# Patient Record
Sex: FEMALE | Race: WHITE | NOT HISPANIC OR LATINO | URBAN - METROPOLITAN AREA
[De-identification: names, ages, dates, MRNs, and addresses within clinical notes are randomized per-mention and may not be internally consistent; named-entity substitution may affect disease eponyms.]

---

## 2017-08-01 ENCOUNTER — EMERGENCY (EMERGENCY)
Facility: HOSPITAL | Age: 47
LOS: 1 days | Discharge: HOME | End: 2017-08-01
Attending: EMERGENCY MEDICINE | Admitting: EMERGENCY MEDICINE
Payer: COMMERCIAL

## 2017-08-01 VITALS
RESPIRATION RATE: 18 BRPM | OXYGEN SATURATION: 97 % | DIASTOLIC BLOOD PRESSURE: 57 MMHG | HEART RATE: 114 BPM | SYSTOLIC BLOOD PRESSURE: 103 MMHG | TEMPERATURE: 100 F

## 2017-08-01 LAB
ALBUMIN SERPL ELPH-MCNC: 4.6 G/DL — SIGNIFICANT CHANGE UP (ref 3.3–5)
ALP SERPL-CCNC: 74 U/L — SIGNIFICANT CHANGE UP (ref 40–120)
ALT FLD-CCNC: 14 U/L RC — SIGNIFICANT CHANGE UP (ref 10–45)
ANION GAP SERPL CALC-SCNC: 13 MMOL/L — SIGNIFICANT CHANGE UP (ref 5–17)
APPEARANCE UR: CLEAR — SIGNIFICANT CHANGE UP
AST SERPL-CCNC: 17 U/L — SIGNIFICANT CHANGE UP (ref 10–40)
BASE EXCESS BLDV CALC-SCNC: 2.2 MMOL/L — HIGH (ref -2–2)
BASOPHILS # BLD AUTO: 0.1 K/UL — SIGNIFICANT CHANGE UP (ref 0–0.2)
BASOPHILS NFR BLD AUTO: 0.5 % — SIGNIFICANT CHANGE UP (ref 0–2)
BILIRUB SERPL-MCNC: 0.4 MG/DL — SIGNIFICANT CHANGE UP (ref 0.2–1.2)
BILIRUB UR-MCNC: NEGATIVE — SIGNIFICANT CHANGE UP
BUN SERPL-MCNC: 10 MG/DL — SIGNIFICANT CHANGE UP (ref 7–23)
CA-I SERPL-SCNC: 1.18 MMOL/L — SIGNIFICANT CHANGE UP (ref 1.12–1.3)
CALCIUM SERPL-MCNC: 9.4 MG/DL — SIGNIFICANT CHANGE UP (ref 8.4–10.5)
CHLORIDE BLDV-SCNC: 101 MMOL/L — SIGNIFICANT CHANGE UP (ref 96–108)
CHLORIDE SERPL-SCNC: 100 MMOL/L — SIGNIFICANT CHANGE UP (ref 96–108)
CO2 BLDV-SCNC: 30 MMOL/L — SIGNIFICANT CHANGE UP (ref 22–30)
CO2 SERPL-SCNC: 26 MMOL/L — SIGNIFICANT CHANGE UP (ref 22–31)
COLOR SPEC: SIGNIFICANT CHANGE UP
CREAT SERPL-MCNC: 0.59 MG/DL — SIGNIFICANT CHANGE UP (ref 0.5–1.3)
DIFF PNL FLD: ABNORMAL
EOSINOPHIL # BLD AUTO: 0.1 K/UL — SIGNIFICANT CHANGE UP (ref 0–0.5)
EOSINOPHIL NFR BLD AUTO: 0.7 % — SIGNIFICANT CHANGE UP (ref 0–6)
EPI CELLS # UR: SIGNIFICANT CHANGE UP /HPF
GAS PNL BLDV: 137 MMOL/L — SIGNIFICANT CHANGE UP (ref 136–145)
GAS PNL BLDV: SIGNIFICANT CHANGE UP
GAS PNL BLDV: SIGNIFICANT CHANGE UP
GLUCOSE BLDV-MCNC: 88 MG/DL — SIGNIFICANT CHANGE UP (ref 70–99)
GLUCOSE SERPL-MCNC: 91 MG/DL — SIGNIFICANT CHANGE UP (ref 70–99)
GLUCOSE UR QL: NEGATIVE — SIGNIFICANT CHANGE UP
HCG UR QL: NEGATIVE — SIGNIFICANT CHANGE UP
HCO3 BLDV-SCNC: 28 MMOL/L — SIGNIFICANT CHANGE UP (ref 21–29)
HCT VFR BLD CALC: 39.1 % — SIGNIFICANT CHANGE UP (ref 34.5–45)
HCT VFR BLDA CALC: 42 % — SIGNIFICANT CHANGE UP (ref 39–50)
HGB BLD CALC-MCNC: 13.7 G/DL — SIGNIFICANT CHANGE UP (ref 11.5–15.5)
HGB BLD-MCNC: 13.3 G/DL — SIGNIFICANT CHANGE UP (ref 11.5–15.5)
KETONES UR-MCNC: NEGATIVE — SIGNIFICANT CHANGE UP
LACTATE BLDV-MCNC: 2 MMOL/L — SIGNIFICANT CHANGE UP (ref 0.7–2)
LEUKOCYTE ESTERASE UR-ACNC: NEGATIVE — SIGNIFICANT CHANGE UP
LIDOCAIN IGE QN: 27 U/L — SIGNIFICANT CHANGE UP (ref 7–60)
LYMPHOCYTES # BLD AUTO: 14.9 % — SIGNIFICANT CHANGE UP (ref 13–44)
LYMPHOCYTES # BLD AUTO: 2.3 K/UL — SIGNIFICANT CHANGE UP (ref 1–3.3)
MCHC RBC-ENTMCNC: 32.4 PG — SIGNIFICANT CHANGE UP (ref 27–34)
MCHC RBC-ENTMCNC: 34 GM/DL — SIGNIFICANT CHANGE UP (ref 32–36)
MCV RBC AUTO: 95.3 FL — SIGNIFICANT CHANGE UP (ref 80–100)
MONOCYTES # BLD AUTO: 1.4 K/UL — HIGH (ref 0–0.9)
MONOCYTES NFR BLD AUTO: 9.2 % — SIGNIFICANT CHANGE UP (ref 2–14)
NEUTROPHILS # BLD AUTO: 11.6 K/UL — HIGH (ref 1.8–7.4)
NEUTROPHILS NFR BLD AUTO: 74.7 % — SIGNIFICANT CHANGE UP (ref 43–77)
NITRITE UR-MCNC: NEGATIVE — SIGNIFICANT CHANGE UP
PCO2 BLDV: 53 MMHG — HIGH (ref 35–50)
PH BLDV: 7.35 — SIGNIFICANT CHANGE UP (ref 7.35–7.45)
PH UR: 6.5 — SIGNIFICANT CHANGE UP (ref 5–8)
PLATELET # BLD AUTO: 193 K/UL — SIGNIFICANT CHANGE UP (ref 150–400)
PO2 BLDV: 29 MMHG — SIGNIFICANT CHANGE UP (ref 25–45)
POTASSIUM BLDV-SCNC: 3.9 MMOL/L — SIGNIFICANT CHANGE UP (ref 3.5–5)
POTASSIUM SERPL-MCNC: 4 MMOL/L — SIGNIFICANT CHANGE UP (ref 3.5–5.3)
POTASSIUM SERPL-SCNC: 4 MMOL/L — SIGNIFICANT CHANGE UP (ref 3.5–5.3)
PROT SERPL-MCNC: 8.1 G/DL — SIGNIFICANT CHANGE UP (ref 6–8.3)
PROT UR-MCNC: SIGNIFICANT CHANGE UP
RBC # BLD: 4.1 M/UL — SIGNIFICANT CHANGE UP (ref 3.8–5.2)
RBC # FLD: 12.2 % — SIGNIFICANT CHANGE UP (ref 10.3–14.5)
RBC CASTS # UR COMP ASSIST: ABNORMAL /HPF (ref 0–2)
SAO2 % BLDV: 42 % — LOW (ref 67–88)
SODIUM SERPL-SCNC: 139 MMOL/L — SIGNIFICANT CHANGE UP (ref 135–145)
SP GR SPEC: 1.02 — SIGNIFICANT CHANGE UP (ref 1.01–1.02)
UROBILINOGEN FLD QL: NEGATIVE — SIGNIFICANT CHANGE UP
WBC # BLD: 15.6 K/UL — HIGH (ref 3.8–10.5)
WBC # FLD AUTO: 15.6 K/UL — HIGH (ref 3.8–10.5)
WBC UR QL: SIGNIFICANT CHANGE UP /HPF (ref 0–5)

## 2017-08-01 PROCEDURE — 74177 CT ABD & PELVIS W/CONTRAST: CPT | Mod: 26

## 2017-08-01 PROCEDURE — 76705 ECHO EXAM OF ABDOMEN: CPT | Mod: 26,RT

## 2017-08-01 PROCEDURE — 99285 EMERGENCY DEPT VISIT HI MDM: CPT

## 2017-08-01 RX ORDER — ACETAMINOPHEN 500 MG
1000 TABLET ORAL ONCE
Refills: 0 | Status: COMPLETED | OUTPATIENT
Start: 2017-08-01 | End: 2017-08-01

## 2017-08-01 RX ORDER — SODIUM CHLORIDE 9 MG/ML
1000 INJECTION INTRAMUSCULAR; INTRAVENOUS; SUBCUTANEOUS
Refills: 0 | Status: DISCONTINUED | OUTPATIENT
Start: 2017-08-01 | End: 2017-08-05

## 2017-08-01 RX ORDER — ONDANSETRON 8 MG/1
4 TABLET, FILM COATED ORAL ONCE
Refills: 0 | Status: COMPLETED | OUTPATIENT
Start: 2017-08-01 | End: 2017-08-01

## 2017-08-01 RX ORDER — SODIUM CHLORIDE 9 MG/ML
1000 INJECTION INTRAMUSCULAR; INTRAVENOUS; SUBCUTANEOUS ONCE
Refills: 0 | Status: COMPLETED | OUTPATIENT
Start: 2017-08-01 | End: 2017-08-01

## 2017-08-01 RX ADMIN — SODIUM CHLORIDE 150 MILLILITER(S): 9 INJECTION INTRAMUSCULAR; INTRAVENOUS; SUBCUTANEOUS at 19:49

## 2017-08-01 RX ADMIN — Medication 400 MILLIGRAM(S): at 18:57

## 2017-08-01 RX ADMIN — Medication 1000 MILLIGRAM(S): at 18:59

## 2017-08-01 RX ADMIN — SODIUM CHLORIDE 2000 MILLILITER(S): 9 INJECTION INTRAMUSCULAR; INTRAVENOUS; SUBCUTANEOUS at 17:00

## 2017-08-01 NOTE — ED PROVIDER NOTE - MEDICAL DECISION MAKING DETAILS
Javi Powell MD (resident): 46 F w/ Hx diverticulitis, who p/w generalized abd pain w/ subjective fever that started 1 day ago, constant, dull, not worse w/ food.

## 2017-08-01 NOTE — ED ADULT NURSE NOTE - OBJECTIVE STATEMENT
45 y/o F, reported to ED from home. A&Ox3, c/o abd pain and fever. Pt reports that she started not feeling well yesterday. Pt reports that she had a temperature of 100.3 the highest. Pt reports that she took Tylenol this morning for the fever. Pt's temperature is currently 98.7 orally. Pt reports that she is having a H/A, denies vision changes. Pt denies N/V/D. Pt reports that "my abdomen feels bloated and I haven't been eating as much." Pt reports that her son is sick with a virus at home. Pt denies LOC, SOB, C/P. Pt reports hx of diverticulitis but states "this discomfort is not like that pain."  at bedside, will continue to monitor pt.

## 2017-08-01 NOTE — ED PROVIDER NOTE - PLAN OF CARE
1) take Cipro and Flagyl as prescribed   2) Drink plenty of fluids   3) You were given a copy of your results, please show them to your doctor for review.   4) Please follow up with your primary medical doctor in 2-3 days for reevaluation. If you do not have pmd please call the general medicine clinic for an appointment at [phone].   5) also, follow up with Gastroenterology in 1-2 days, call [phone] for appointment   6) return to the ED for worsening pain, nausea, vomiting, fever greater than 100.4, chest pain, shortness of breath, diarrhea, constipation, or if you have any other new, worsening, or concerning symptoms.

## 2017-08-01 NOTE — ED PROVIDER NOTE - PHYSICAL EXAMINATION
Physical Exam: middle aged F who is in NAD, AAOx3, NCAT, MMM, neck is supple, PERRL, CTAB, tachycardia and regular rhythm, abdomen is soft and NTND, No edema, No deformity of extremities, No rashes, CN grossly intact, No focal motor or sensory deficits. ~ Javi Powell MD

## 2017-08-01 NOTE — ED PROVIDER NOTE - CARE PLAN
Principal Discharge DX:	Diverticulitis  Instructions for follow-up, activity and diet:	1) take Cipro and Flagyl as prescribed   2) Drink plenty of fluids   3) You were given a copy of your results, please show them to your doctor for review.   4) Please follow up with your primary medical doctor in 2-3 days for reevaluation. If you do not have pmd please call the general medicine clinic for an appointment at [phone].   5) also, follow up with Gastroenterology in 1-2 days, call [phone] for appointment   6) return to the ED for worsening pain, nausea, vomiting, fever greater than 100.4, chest pain, shortness of breath, diarrhea, constipation, or if you have any other new, worsening, or concerning symptoms. Principal Discharge DX:	Diverticulitis  Instructions for follow-up, activity and diet:	1) take Cipro and Flagyl as prescribed   2) Drink plenty of fluids   3) You were given a copy of your results, please show them to your doctor for review.   4) Please follow up with your primary medical doctor in 2-3 days for reevaluation. If you do not have pmd please call the general medicine clinic for an appointment at [phone].   5) also, follow up with Gastroenterology in 1-2 days, call [phone] for appointment   6) return to the ED for worsening pain, nausea, vomiting, fever greater than 100.4, chest pain, shortness of breath, diarrhea, constipation, or if you have any other new, worsening, or concerning symptoms.

## 2017-08-01 NOTE — ED PROVIDER NOTE - OBJECTIVE STATEMENT
46 F w/ Hx diverticulitis, who p/w generalized abd pain w/ subjective fever that started 1 day ago, constant, dull, not worse w/ food. Also reports body aches. + decreased appetite. Last BM yesterday, no vomiting, diarrhea, or dysuria.     PMD: Steven Callejas

## 2017-08-01 NOTE — ED PROVIDER NOTE - ATTENDING CONTRIBUTION TO CARE
I have examined and evaluated this patient with the above resident or PA, and agree with the documented clinical history, exam and plan.   Briefly: Pt with h/o diverticulitis; p/w RUQ and right mid abdominal pain; on exam is well appearing but tender in right side of abdomen.  RUQ US performed, no signs of acute cholecystitis.  CT performed, showing diverticulitis at the hepatic flexure.  Patient remains well appearing, tolerating po; will dc on ABx.

## 2017-08-01 NOTE — ED PROVIDER NOTE - PROGRESS NOTE DETAILS
MD Sukumar: Feels improved, Mild right upper quadrant tenderness now, acute diverticulitis non complicated, will d/c home, strict return precautions.

## 2017-08-02 VITALS
OXYGEN SATURATION: 100 % | RESPIRATION RATE: 20 BRPM | SYSTOLIC BLOOD PRESSURE: 120 MMHG | HEART RATE: 100 BPM | TEMPERATURE: 98 F | DIASTOLIC BLOOD PRESSURE: 66 MMHG

## 2017-08-02 PROCEDURE — 85014 HEMATOCRIT: CPT

## 2017-08-02 PROCEDURE — 99284 EMERGENCY DEPT VISIT MOD MDM: CPT | Mod: 25

## 2017-08-02 PROCEDURE — 82947 ASSAY GLUCOSE BLOOD QUANT: CPT

## 2017-08-02 PROCEDURE — 83690 ASSAY OF LIPASE: CPT

## 2017-08-02 PROCEDURE — 74177 CT ABD & PELVIS W/CONTRAST: CPT

## 2017-08-02 PROCEDURE — 81001 URINALYSIS AUTO W/SCOPE: CPT

## 2017-08-02 PROCEDURE — 81025 URINE PREGNANCY TEST: CPT

## 2017-08-02 PROCEDURE — 76705 ECHO EXAM OF ABDOMEN: CPT

## 2017-08-02 PROCEDURE — 83605 ASSAY OF LACTIC ACID: CPT

## 2017-08-02 PROCEDURE — 84132 ASSAY OF SERUM POTASSIUM: CPT

## 2017-08-02 PROCEDURE — 82435 ASSAY OF BLOOD CHLORIDE: CPT

## 2017-08-02 PROCEDURE — 96374 THER/PROPH/DIAG INJ IV PUSH: CPT | Mod: XU

## 2017-08-02 PROCEDURE — 80053 COMPREHEN METABOLIC PANEL: CPT

## 2017-08-02 PROCEDURE — 84295 ASSAY OF SERUM SODIUM: CPT

## 2017-08-02 PROCEDURE — 82803 BLOOD GASES ANY COMBINATION: CPT

## 2017-08-02 PROCEDURE — 82330 ASSAY OF CALCIUM: CPT

## 2017-08-02 PROCEDURE — 85027 COMPLETE CBC AUTOMATED: CPT

## 2017-08-02 RX ORDER — METRONIDAZOLE 500 MG
500 TABLET ORAL ONCE
Refills: 0 | Status: COMPLETED | OUTPATIENT
Start: 2017-08-02 | End: 2017-08-02

## 2017-08-02 RX ORDER — MOXIFLOXACIN HYDROCHLORIDE TABLETS, 400 MG 400 MG/1
1 TABLET, FILM COATED ORAL
Qty: 20 | Refills: 0
Start: 2017-08-02 | End: 2017-08-12

## 2017-08-02 RX ORDER — CIPROFLOXACIN LACTATE 400MG/40ML
500 VIAL (ML) INTRAVENOUS ONCE
Refills: 0 | Status: COMPLETED | OUTPATIENT
Start: 2017-08-02 | End: 2017-08-02

## 2017-08-02 RX ORDER — METRONIDAZOLE 500 MG
1 TABLET ORAL
Qty: 42 | Refills: 0
Start: 2017-08-02 | End: 2017-08-16

## 2017-08-02 RX ADMIN — Medication 500 MILLIGRAM(S): at 00:30

## 2017-12-12 ENCOUNTER — APPOINTMENT (OUTPATIENT)
Dept: OTOLARYNGOLOGY | Facility: CLINIC | Age: 47
End: 2017-12-12
Payer: COMMERCIAL

## 2017-12-12 VITALS
HEART RATE: 64 BPM | SYSTOLIC BLOOD PRESSURE: 106 MMHG | DIASTOLIC BLOOD PRESSURE: 45 MMHG | HEIGHT: 66 IN | WEIGHT: 150 LBS | BODY MASS INDEX: 24.11 KG/M2

## 2017-12-12 DIAGNOSIS — Z83.3 FAMILY HISTORY OF DIABETES MELLITUS: ICD-10-CM

## 2017-12-12 DIAGNOSIS — Z82.3 FAMILY HISTORY OF STROKE: ICD-10-CM

## 2017-12-12 DIAGNOSIS — Z78.9 OTHER SPECIFIED HEALTH STATUS: ICD-10-CM

## 2017-12-12 DIAGNOSIS — Z80.9 FAMILY HISTORY OF MALIGNANT NEOPLASM, UNSPECIFIED: ICD-10-CM

## 2017-12-12 PROCEDURE — 31231 NASAL ENDOSCOPY DX: CPT

## 2017-12-12 PROCEDURE — 99244 OFF/OP CNSLTJ NEW/EST MOD 40: CPT | Mod: 25

## 2018-01-29 ENCOUNTER — APPOINTMENT (OUTPATIENT)
Dept: OTOLARYNGOLOGY | Facility: CLINIC | Age: 48
End: 2018-01-29
Payer: COMMERCIAL

## 2018-01-29 VITALS
HEIGHT: 66 IN | DIASTOLIC BLOOD PRESSURE: 70 MMHG | BODY MASS INDEX: 24.11 KG/M2 | WEIGHT: 150 LBS | HEART RATE: 70 BPM | SYSTOLIC BLOOD PRESSURE: 106 MMHG

## 2018-01-29 DIAGNOSIS — J35.1 HYPERTROPHY OF TONSILS: ICD-10-CM

## 2018-01-29 PROCEDURE — 99214 OFFICE O/P EST MOD 30 MIN: CPT

## 2018-02-07 ENCOUNTER — APPOINTMENT (OUTPATIENT)
Dept: OBGYN | Facility: CLINIC | Age: 48
End: 2018-02-07

## 2018-02-13 ENCOUNTER — APPOINTMENT (OUTPATIENT)
Dept: OBGYN | Facility: CLINIC | Age: 48
End: 2018-02-13

## 2018-02-25 ENCOUNTER — TRANSCRIPTION ENCOUNTER (OUTPATIENT)
Age: 48
End: 2018-02-25

## 2018-02-26 ENCOUNTER — APPOINTMENT (OUTPATIENT)
Dept: OTOLARYNGOLOGY | Facility: CLINIC | Age: 48
End: 2018-02-26

## 2018-04-16 ENCOUNTER — APPOINTMENT (OUTPATIENT)
Dept: OTOLARYNGOLOGY | Facility: CLINIC | Age: 48
End: 2018-04-16

## 2018-07-25 PROBLEM — Z78.9 ALCOHOL USE: Status: ACTIVE | Noted: 2017-12-12

## 2018-10-19 ENCOUNTER — TRANSCRIPTION ENCOUNTER (OUTPATIENT)
Age: 48
End: 2018-10-19

## 2019-03-07 ENCOUNTER — APPOINTMENT (OUTPATIENT)
Dept: SURGERY | Facility: CLINIC | Age: 49
End: 2019-03-07
Payer: COMMERCIAL

## 2019-03-07 VITALS
BODY MASS INDEX: 25.71 KG/M2 | OXYGEN SATURATION: 98 % | DIASTOLIC BLOOD PRESSURE: 64 MMHG | WEIGHT: 160 LBS | HEIGHT: 66 IN | HEART RATE: 90 BPM | TEMPERATURE: 98.3 F | SYSTOLIC BLOOD PRESSURE: 103 MMHG | RESPIRATION RATE: 15 BRPM

## 2019-03-07 DIAGNOSIS — Z83.79 FAMILY HISTORY OF OTHER DISEASES OF THE DIGESTIVE SYSTEM: ICD-10-CM

## 2019-03-07 DIAGNOSIS — K57.32 DIVERTICULITIS OF LARGE INTESTINE W/OUT PERFORATION OR ABSCESS W/OUT BLEEDING: ICD-10-CM

## 2019-03-07 PROCEDURE — 99244 OFF/OP CNSLTJ NEW/EST MOD 40: CPT

## 2019-03-07 RX ORDER — PNV NO.95/FERROUS FUM/FOLIC AC 28MG-0.8MG
TABLET ORAL
Refills: 0 | Status: ACTIVE | COMMUNITY

## 2019-03-07 RX ORDER — CHOLECALCIFEROL (VITAMIN D3) 25 MCG
TABLET ORAL
Refills: 0 | Status: ACTIVE | COMMUNITY

## 2019-03-07 RX ORDER — OMEGA-3/DHA/EPA/FISH OIL 300-1000MG
CAPSULE ORAL
Refills: 0 | Status: ACTIVE | COMMUNITY

## 2019-03-07 RX ORDER — AZITHROMYCIN DIHYDRATE 250 MG/1
250 TABLET, FILM COATED ORAL
Qty: 1 | Refills: 2 | Status: DISCONTINUED | COMMUNITY
Start: 2017-12-12 | End: 2019-03-07

## 2019-03-07 RX ORDER — ESCITALOPRAM OXALATE 10 MG/1
10 TABLET, FILM COATED ORAL
Refills: 0 | Status: DISCONTINUED | COMMUNITY
End: 2019-03-07

## 2019-03-07 RX ORDER — METHYLPREDNISOLONE 4 MG/1
4 TABLET ORAL
Qty: 1 | Refills: 0 | Status: DISCONTINUED | COMMUNITY
Start: 2017-12-12 | End: 2019-03-07

## 2019-03-07 RX ORDER — MULTIVITAMIN
TABLET ORAL
Refills: 0 | Status: ACTIVE | COMMUNITY

## 2019-03-07 NOTE — PHYSICAL EXAM
[Normal Breath Sounds] : Normal breath sounds [Normal Heart Sounds] : normal heart sounds [Normal Rate and Rhythm] : normal rate and rhythm [No Rash or Lesion] : No rash or lesion [Alert] : alert [Oriented to Person] : oriented to person [Oriented to Place] : oriented to place [Oriented to Time] : oriented to time [Calm] : calm [Abdomen Masses] : No abdominal masses [Abdomen Tenderness] : ~T No ~M abdominal tenderness [No HSM] : no hepatosplenomegaly [Normal rectal exam] : exam was normal [JVD] : no jugular venous distention  [de-identified] : Well nourished female, in no apparent distress [de-identified] : WNL [de-identified] : Full ROM

## 2019-03-07 NOTE — HISTORY OF PRESENT ILLNESS
[FreeTextEntry1] : Micheline is a 47 y/o female here for evaluation of diverticulitis. Patient reports multiple bouts of diverticulitis attacks starting at age 30. She was hospitalized one time for diverticulitis. Each time she is treated with Cipro and Flagyl. Today, denies abdominal pain. Has occasional RLQ abdominal twinges of pain. Has normal soft BM daily. \par \par CT from 2/6/19 demonstrated minimal residual thickening involving a diverticulum in the proximal ascending colon with resolved surrounding inflammatory stranding compatible with resolving diverticulitis compared to CT 12/14/18. No pericolonic fluid collection. Pancolonic diverticulosis. \par \par CT from 12/14/18 demonstrated diverticulitis in the proximal ascending colon. No abscess or extraluminal gas collection. There is confluent opacity in the area of inflammation. Normal appendix. Extensive diverticulosis is noted elsewhere throughout the colon. \par \par CT from 8/1/17 demonstrated acute diverticulitis involving the proximal transverse colon at the hepatic flexure with moderate surrounding inflammatory change. No extraluminal free air or fluid collection. \par \par Colonoscopy from 11/13/17 demonstrated moderate diverticulosis noted throughout the entire colon. 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 Pathology: Melanosis coli. Small grade 1 hemorrhoids.

## 2019-03-07 NOTE — ASSESSMENT
[FreeTextEntry1] : I have seen and evaluated patient, and I have corroborated all nursing input into this note. Patient with recurrent episodes of right-sided diverticulitis. An elective laparoscopic resection should be strongly considered. The patient has not had left-sided diverticulitis but does have pan colonic diverticulosis. The patient is at high risk for recurrent diverticulitis on the right side because of her history. However, she is at low risk for diverticulitis on the left side because she has had not had episodes on that side and her risk on the left side is no different than anyone who has diverticulosis and who has not had diverticulitis. I reviewed indications, risks, benefits, alternatives for laparoscopic possible open right colectomy including but not limited to bleeding, infection, change in bowel habits, and anastomotic leak. All questions were answered. The patient will make a decision about surgery and she'll notify my office. The patient knows that she should contact my office immediately if she develops any recurrent signs or symptoms of diverticulitis.

## 2019-03-07 NOTE — CONSULT LETTER
[Dear  ___] : Dear ~IGLESIA, [Consult Letter:] : I had the pleasure of evaluating your patient, [unfilled]. [Please see my note below.] : Please see my note below. [Consult Closing:] : Thank you very much for allowing me to participate in the care of this patient.  If you have any questions, please do not hesitate to contact me. [Sincerely,] : Sincerely, [FreeTextEntry2] : Dr. Yaw Mcnamara [FreeTextEntry3] : Real Wu M.D., DERBA.GOYO., F.MAYNOR.S.[email or web address].\[facility] Chief Colorectal Clinical Services, [facility]

## 2019-08-18 ENCOUNTER — TRANSCRIPTION ENCOUNTER (OUTPATIENT)
Age: 49
End: 2019-08-18

## 2019-09-19 ENCOUNTER — TRANSCRIPTION ENCOUNTER (OUTPATIENT)
Age: 49
End: 2019-09-19

## 2020-02-16 ENCOUNTER — TRANSCRIPTION ENCOUNTER (OUTPATIENT)
Age: 50
End: 2020-02-16

## 2020-04-19 ENCOUNTER — TRANSCRIPTION ENCOUNTER (OUTPATIENT)
Age: 50
End: 2020-04-19

## 2020-07-12 ENCOUNTER — TRANSCRIPTION ENCOUNTER (OUTPATIENT)
Age: 50
End: 2020-07-12

## 2020-11-06 ENCOUNTER — APPOINTMENT (OUTPATIENT)
Dept: ORTHOPEDIC SURGERY | Facility: CLINIC | Age: 50
End: 2020-11-06
Payer: COMMERCIAL

## 2020-11-06 VITALS
WEIGHT: 163 LBS | TEMPERATURE: 97.9 F | HEIGHT: 66 IN | HEART RATE: 88 BPM | DIASTOLIC BLOOD PRESSURE: 76 MMHG | SYSTOLIC BLOOD PRESSURE: 110 MMHG | BODY MASS INDEX: 26.2 KG/M2

## 2020-11-06 DIAGNOSIS — M75.41 IMPINGEMENT SYNDROME OF RIGHT SHOULDER: ICD-10-CM

## 2020-11-06 PROCEDURE — 99203 OFFICE O/P NEW LOW 30 MIN: CPT | Mod: 25

## 2020-11-06 PROCEDURE — 73030 X-RAY EXAM OF SHOULDER: CPT | Mod: RT

## 2020-11-06 PROCEDURE — 99072 ADDL SUPL MATRL&STAF TM PHE: CPT

## 2020-11-06 PROCEDURE — 20610 DRAIN/INJ JOINT/BURSA W/O US: CPT | Mod: RT

## 2020-12-11 ENCOUNTER — RESULT REVIEW (OUTPATIENT)
Age: 50
End: 2020-12-11

## 2020-12-16 ENCOUNTER — RX RENEWAL (OUTPATIENT)
Age: 50
End: 2020-12-16

## 2020-12-23 ENCOUNTER — TRANSCRIPTION ENCOUNTER (OUTPATIENT)
Age: 50
End: 2020-12-23

## 2020-12-30 ENCOUNTER — EMERGENCY (EMERGENCY)
Facility: HOSPITAL | Age: 50
LOS: 1 days | Discharge: HOME | End: 2020-12-30
Attending: STUDENT IN AN ORGANIZED HEALTH CARE EDUCATION/TRAINING PROGRAM
Payer: COMMERCIAL

## 2020-12-30 VITALS
WEIGHT: 160.06 LBS | TEMPERATURE: 99 F | RESPIRATION RATE: 17 BRPM | HEART RATE: 98 BPM | SYSTOLIC BLOOD PRESSURE: 104 MMHG | DIASTOLIC BLOOD PRESSURE: 70 MMHG | HEIGHT: 66 IN | OXYGEN SATURATION: 98 %

## 2020-12-30 PROCEDURE — 70450 CT HEAD/BRAIN W/O DYE: CPT

## 2020-12-30 PROCEDURE — 70450 CT HEAD/BRAIN W/O DYE: CPT | Mod: 26

## 2020-12-30 PROCEDURE — 99284 EMERGENCY DEPT VISIT MOD MDM: CPT

## 2020-12-30 PROCEDURE — 99284 EMERGENCY DEPT VISIT MOD MDM: CPT | Mod: 25

## 2020-12-30 RX ORDER — ACETAMINOPHEN 500 MG
975 TABLET ORAL ONCE
Refills: 0 | Status: COMPLETED | OUTPATIENT
Start: 2020-12-30 | End: 2020-12-30

## 2020-12-30 RX ADMIN — Medication 975 MILLIGRAM(S): at 22:18

## 2020-12-30 NOTE — ED PROVIDER NOTE - CHPI ED SYMPTOMS NEG
no numbness no weakness/no blurred vision/no loss of consciousness/no weakness/no change in level of consciousness

## 2020-12-30 NOTE — ED PROVIDER NOTE - PROGRESS NOTE DETAILS
pettet- Patient feels well, tolerating PO. Discussed radiology findings with patient. Patient feels comfortable going home. Gave home care and follow up instructions. Discussed which symptoms to look out for and when to return to the ED for further evaluation. Patient given opportunity to ask questions about their medical condition and had all questions answered.

## 2020-12-30 NOTE — ED ADULT NURSE NOTE - OBJECTIVE STATEMENT
50 year old female presenting to ED c/o headache. PMH includes HLD. Pt A+Ox3, ambulates appropriately, reports headache x3 days s/p head injury. Pt reports standing up suddenly in crawl space and hitting back of head. Pt also reports intermittent dizziness, difficulty concentrating, fogginess, and occasionally feels off balance. Pt reports pain is exacerbated with movement and exertion, and alleviated with Tylenol. Pt denies change in vision, chest pain, SOB, N/V, abdominal pain, urinary or bowel symptoms, fevers or chills.

## 2020-12-30 NOTE — ED PROVIDER NOTE - PHYSICAL EXAMINATION
GEN: Pt in NAD, A&O x3. GCS 15.   PSYCH: Anxious.  EYES: No periorbital ecchymosis, sclera white w/o injection PERRLA, EOMI w/o pain.  HENT: Head NCAT. Nares without deformity, no DC. No auricular tenderness, no ear DC. no Rowley's sign. Neck supple FROM. No midline or paraspinal tenderness. Trachea midline.   RESP: No retractions or chest wall deformities, no signs of trauma/bruising. No chest wall tenderness, CTA b/l, no wheezes, rales, or rhonchi.   CARDIAC: RRR clear distinct S1, S2, no murmurs, gallops, or rubs.   MSK: No joint erythema or obvious deformities. Spine without obvious deformity. No midline tenderness. FROM w/o pain of UE and LE b/l.   NEURO: CN2-12 intact. Normal and equal sensation UE, LE and face b/l. 5/5 strength upper and lower extremity b/l. Pronator drift negative. Normal gait and gross cerebellar functioning.   SKIN: No rashes noted.

## 2020-12-30 NOTE — ED PROVIDER NOTE - NSFOLLOWUPINSTRUCTIONS_ED_ALL_ED_FT
1) Follow up with your PCP within 24-48 hours.      Follow up in the concussion clinic    2) Rest, Take Tylenol 650mg (2 regular strength pills) every 4-6 hours as needed for pain.    3) Avoid work, school, and physical activity until instructed to return by a medical provider (at least 24 hours)    4) **Return to the ER immediately if you experience:    -Inability to awaken the patient at time of expected wakening    -Severe or worsening headaches    -Prolonged sleeping or confusion    -Restlessness, unsteadiness, or seizures    -Difficulties with vision    -Vomiting, fever, or stiff neck    -Urinary or bowel incontinence    -Weakness or numbness involving any part of the body.

## 2020-12-30 NOTE — ED PROVIDER NOTE - PATIENT PORTAL LINK FT
You can access the FollowMyHealth Patient Portal offered by [facility] [place] by registering at the following website: [email or web address]. By joining [email or web address] FollowMyHealth portal, you will also be able to view your health information using other applications (apps) compatible with our system.

## 2020-12-30 NOTE — ED PROVIDER NOTE - ATTENDING CONTRIBUTION TO CARE
I performed a history and physical exam of the patient and discussed their management with the PA. I reviewed the PA's note and agree with the documented findings and plan of care. My medical decision making and observations are found above.    50F p/w mult medical complaints s/p head injury 3d ago. Has low mechanism head injury, no loc, no ac 3d ago now with intermittent fatigue, difficulty concentrating. She occasionally feels off balance and dizzy. No excessive vomiting, double vision, weakness. On exam, patient well appearing, nad. cv rrr no m/r/g, lungs ctabl no resp distress. no evidence of depressed skull fracture/basilar skull fracture. cn2-12 groslsy intact, normal speech and tone, normal gait. No evidence of meningismus. 5/5 strength in distal extremities b/l. Benign exam and low suspicion for ich although given patient's level of concern, will ct head to eval for subacute ICH. more likely c/w postconcussive syndrome. Will reassess pending imaging. likely d/c home with strict return precautions and close f/u.

## 2020-12-30 NOTE — ED PROVIDER NOTE - OBJECTIVE STATEMENT
50 year old F with pmhx of HLD presents to ED c/o HA s/p head injury 3 days ago. Pt reports that 3 days ago she was in a crawl space and stood up suddenly, hitting her head on the ceiling. No LOC. No change in sensorium. Afterwards pt endorses intermittent HA. Has been taking Tylenol intermittently, last dose this am with improvement. Pt notes pain improved yesterday but worsening again today. She reports fatigue, difficulty concentrating. She occasionally feels off balance and dizzy, which she describes as everyone is spinning. Pt notes tingling on the left upper face. Denies other numbness, paraesthesias, weakness, vomiting, syncope, change in vision, difficulty speaking, difficulty swallowing. Not on ACs. Pt has hx of 2 prior concussions but hasn't had one in 7 years. Pt reports that pain is worse with moving her extremities or lifting a dog crate earlier today.

## 2020-12-30 NOTE — ED PROVIDER NOTE - INTERNATIONAL TRAVEL
11/17/19                            Radha Alexander  [address]   [place] [address]    To Whom It May Concern:    This is to certify Radha Alexander was evaluated with Lesa Piña CNP on 11/17/19 and can return to school on 11/19/2019.     RESTRICTIONS: NONE            Lesa Piña CNP  [facility] [address] [address]  Phone: [phone]  Fax: [phone]                     No

## 2020-12-31 VITALS
DIASTOLIC BLOOD PRESSURE: 46 MMHG | OXYGEN SATURATION: 98 % | SYSTOLIC BLOOD PRESSURE: 95 MMHG | RESPIRATION RATE: 18 BRPM | HEART RATE: 71 BPM | TEMPERATURE: 98 F

## 2021-01-06 ENCOUNTER — APPOINTMENT (OUTPATIENT)
Dept: ORTHOPEDIC SURGERY | Facility: CLINIC | Age: 51
End: 2021-01-06

## 2021-01-13 ENCOUNTER — RESULT REVIEW (OUTPATIENT)
Age: 51
End: 2021-01-13

## 2021-01-27 ENCOUNTER — OUTPATIENT (OUTPATIENT)
Dept: OUTPATIENT SERVICES | Facility: HOSPITAL | Age: 51
LOS: 1 days | End: 2021-01-27
Payer: COMMERCIAL

## 2021-01-27 ENCOUNTER — APPOINTMENT (OUTPATIENT)
Dept: MAMMOGRAPHY | Facility: CLINIC | Age: 51
End: 2021-01-27
Payer: COMMERCIAL

## 2021-01-27 DIAGNOSIS — Z00.00 ENCOUNTER FOR GENERAL ADULT MEDICAL EXAMINATION WITHOUT ABNORMAL FINDINGS: ICD-10-CM

## 2021-01-27 PROCEDURE — 77067 SCR MAMMO BI INCL CAD: CPT

## 2021-01-27 PROCEDURE — 77063 BREAST TOMOSYNTHESIS BI: CPT

## 2021-01-27 PROCEDURE — 77063 BREAST TOMOSYNTHESIS BI: CPT | Mod: 26

## 2021-01-27 PROCEDURE — 77067 SCR MAMMO BI INCL CAD: CPT | Mod: 26

## 2021-04-07 ENCOUNTER — APPOINTMENT (OUTPATIENT)
Dept: DISASTER EMERGENCY | Facility: OTHER | Age: 51
End: 2021-04-07

## 2022-02-05 ENCOUNTER — TRANSCRIPTION ENCOUNTER (OUTPATIENT)
Age: 52
End: 2022-02-05

## 2022-02-06 ENCOUNTER — RESULT REVIEW (OUTPATIENT)
Age: 52
End: 2022-02-06

## 2022-02-28 PROBLEM — E78.5 HYPERLIPIDEMIA, UNSPECIFIED: Chronic | Status: ACTIVE | Noted: 2020-12-30

## 2022-03-23 ENCOUNTER — APPOINTMENT (OUTPATIENT)
Dept: MAMMOGRAPHY | Facility: CLINIC | Age: 52
End: 2022-03-23
Payer: COMMERCIAL

## 2022-03-23 ENCOUNTER — OUTPATIENT (OUTPATIENT)
Dept: OUTPATIENT SERVICES | Facility: HOSPITAL | Age: 52
LOS: 1 days | End: 2022-03-23
Payer: COMMERCIAL

## 2022-03-23 DIAGNOSIS — Z00.8 ENCOUNTER FOR OTHER GENERAL EXAMINATION: ICD-10-CM

## 2022-03-23 PROCEDURE — 77067 SCR MAMMO BI INCL CAD: CPT | Mod: 26

## 2022-03-23 PROCEDURE — 77067 SCR MAMMO BI INCL CAD: CPT

## 2022-03-23 PROCEDURE — 77063 BREAST TOMOSYNTHESIS BI: CPT

## 2022-03-23 PROCEDURE — 77063 BREAST TOMOSYNTHESIS BI: CPT | Mod: 26

## 2022-04-08 ENCOUNTER — APPOINTMENT (OUTPATIENT)
Dept: OTOLARYNGOLOGY | Facility: CLINIC | Age: 52
End: 2022-04-08
Payer: COMMERCIAL

## 2022-04-08 VITALS
HEART RATE: 66 BPM | HEIGHT: 66 IN | TEMPERATURE: 98.2 F | BODY MASS INDEX: 26.2 KG/M2 | WEIGHT: 163 LBS | DIASTOLIC BLOOD PRESSURE: 79 MMHG | SYSTOLIC BLOOD PRESSURE: 108 MMHG

## 2022-04-08 PROCEDURE — 31231 NASAL ENDOSCOPY DX: CPT

## 2022-04-08 PROCEDURE — 99072 ADDL SUPL MATRL&STAF TM PHE: CPT

## 2022-04-08 PROCEDURE — 69210 REMOVE IMPACTED EAR WAX UNI: CPT

## 2022-04-08 PROCEDURE — 99204 OFFICE O/P NEW MOD 45 MIN: CPT | Mod: 25

## 2022-04-08 NOTE — END OF VISIT
[FreeTextEntry3] : I saw and examined this patient in person. I have discussed with Zully Craven, Physician Assistant, in detail the above note and agree with the above assessment and plan of care.\par

## 2022-04-08 NOTE — REVIEW OF SYSTEMS
[Ear Itch] : ear itch [Negative] : Heme/Lymph [Seasonal Allergies] : seasonal allergies [de-identified] : ear clogging

## 2022-04-08 NOTE — HISTORY OF PRESENT ILLNESS
[de-identified] : Patient has been having some ear clogging in both ears, worse on the left for the last few months. She does not have any pain in the ears or pressure and does not have any ringing in the ears or dizziness. She has mild seasonal allergies but no acute nasal congestion or runny nose. She does not have any concerns for her hearing. She does have occasional mild itching in the left ear. She did recently try to clean both ears with an over the counter liquid and bulb syringe. When her allergies are acting up she uses Flonase at night

## 2022-06-01 ENCOUNTER — APPOINTMENT (OUTPATIENT)
Dept: ORTHOPEDIC SURGERY | Facility: CLINIC | Age: 52
End: 2022-06-01
Payer: COMMERCIAL

## 2022-06-01 DIAGNOSIS — Z78.9 OTHER SPECIFIED HEALTH STATUS: ICD-10-CM

## 2022-06-01 DIAGNOSIS — M79.672 PAIN IN LEFT FOOT: ICD-10-CM

## 2022-06-01 PROCEDURE — 99203 OFFICE O/P NEW LOW 30 MIN: CPT

## 2022-06-01 PROCEDURE — 99072 ADDL SUPL MATRL&STAF TM PHE: CPT

## 2022-06-01 PROCEDURE — 73610 X-RAY EXAM OF ANKLE: CPT | Mod: LT

## 2022-06-01 NOTE — HISTORY OF PRESENT ILLNESS
[7] : 7 [5] : 5 [Dull/Aching] : dull/aching [Localized] : localized [Throbbing] : throbbing [Constant] : constant [de-identified] : 06/01/22  TRINA DU is a 51 year female who is here today for lt lower leg pain. She states she fell a year ago and had a fracture that didn't get better properly. \par  \par Ms. DU is a 51 year female who presents today for evaluation of their Left leg and left ankle pain and swelling. She states that close to a year ago she fell and injured the proximal aspect of the left leg both in the front and on the side of her left leg and she continues to have sharp pain which she points mostly to the front inside aspect of the proximal leg. She also has diffuse pain in the left ankle as well as diffuse achiness and soreness in the entire left leg. She does have pain also and soreness on the outside aspect of her leg. She feels that two months after her fall she did have a sprain type of injury to the left ankle back in august period she has been using an ankle sleeve period [] : no [FreeTextEntry1] : Lt  lower leg [FreeTextEntry5] :  TRINA DU is a 51 year female who is here today for lt lower leg pain.  [de-identified] : X Rays [de-identified] : ice, creams

## 2022-06-01 NOTE — ASSESSMENT
[FreeTextEntry1] : After her examination today in the office and review of her radiographs I am concerned for her proximal leg pain as well as her diffuse leg pain and diffuse ankle pain after her injury close to one year ago and a moderate to severe ankle sprain eight months ago. I am concerned for a possible syndesmotic chronic injury as a result of their injury and I would like to order a MRI of both the tibia and fibula as well as a MRI of the ankle to further evaluate her sharp pain that she has had for the past year along the proximal tibia as well as her diffuse leg pain and ankle pain with a MRI of the ankle to further evaluate the ankle ligaments as well as the syndesmotic ligaments and stability period i did recommend a better supportive lace up ankle brace on a daily basis and to arrange from any running or jumping and i would like to see her back after the MRI's are completed

## 2022-06-01 NOTE — PHYSICAL EXAM
[de-identified] : General: Well-nourished, well developed female in no apparent distress\par Psych: Clear speech, pleasant mood and affect\par Neurological: Alert and oriented to person, place, and time\par Gait: Antalgic\par Respiratory: Normal respiratory effort\par Skin: No rashes, no lesions, no open skin, no erythema\par \par On examination of the left ankle and foot:\par \par Inspection: Mild swelling without ecchymosis over the medial and  lateral aspect of the ankle. No erythema noted. Hindfoot alignment is anatomic valgus.\par \par Palpation: They are tender over the anterior ankle joint line. Tenderness over the anterior lateral ankle over the area of the ATFL and CFL. They are tender medially over the distal tip of the medial malleolus and the deltoid ligament. They are also mildly tender over the lateral malleolus. There is  tenderness over the proximal, mid shaft tibia or fibula or syndesmosis. The leg compartments are soft and nontender. The calf is soft and nontender with a downgoing Espinoza test. There is mild tenderness over the peroneal tendons which are stable. They are tender over the proximal and mid and distal syndesmosis. Pain with compression test of the syndesmosis. Tender over the fibula neck/head. No pain over the course of the Achilles, or posterior tibial tendons. Non-tender over the sinus tarsi.\par \par On examination of the foot: No tenderness over the transverse tarsal joints, TMT or the Lisfranc joints on palpation and stress examination. No tenderness over the calcaneus, navicular, cuboid, cuneiforms, or metatarsals shafts. Full range of motion through the MTP joints. No pain on examination of the toes.\par \par ROM: 5 degrees of dorsiflexion, 25 degrees of plantar flexion, 15 degrees of inversion and 10 degrees of eversion with discomfort over the lateral ankle.\par \par Muscle Strength: 4+/5 strength with resisted dorsiflexion, plantar flexion, inversion and eversion with pain on resisted eversion.\par \par Stability: No instability noted with varus/valgus stress, with slight laxity of the lateral ligament complex\par \par Tests: Anterior and posterior drawer test negative. Negative syndesmotic squeeze test.\par \par Neurologic Exam Sensation grossly intact to light touch throughout. 2+ Achilles tendon reflexes with downgoing Babinski test, no clonus\par \par Vascular: 2+ dorsalis pedis and posterior tibial pulses with brisk capillary refill in all toes.\par No pitting edema, no varicosities on bilateral lower extremities.\par \par XRAYS (3v Ankle and foot ): Xrays done today in the office were 3 views of the foot and 3 views of the ankle weight bearing with no limitation to today's study which reveal no acute fractures or dislocations seen. The ankle mortise is reduced and well aligned. There is no widening of the syndesmosis. No evidence of a osteochondral defect. The midfoot and forefoot joints are reduced and well aligned.\par

## 2022-06-07 ENCOUNTER — FORM ENCOUNTER (OUTPATIENT)
Age: 52
End: 2022-06-07

## 2022-06-08 ENCOUNTER — APPOINTMENT (OUTPATIENT)
Dept: MRI IMAGING | Facility: CLINIC | Age: 52
End: 2022-06-08
Payer: COMMERCIAL

## 2022-06-08 PROCEDURE — 73721 MRI JNT OF LWR EXTRE W/O DYE: CPT | Mod: LT

## 2022-06-08 PROCEDURE — 73718 MRI LOWER EXTREMITY W/O DYE: CPT | Mod: LT

## 2022-06-08 PROCEDURE — 99072 ADDL SUPL MATRL&STAF TM PHE: CPT

## 2022-06-15 ENCOUNTER — APPOINTMENT (OUTPATIENT)
Dept: ORTHOPEDIC SURGERY | Facility: CLINIC | Age: 52
End: 2022-06-15
Payer: COMMERCIAL

## 2022-06-15 VITALS — HEIGHT: 66 IN | WEIGHT: 163 LBS | BODY MASS INDEX: 26.2 KG/M2

## 2022-06-15 DIAGNOSIS — S93.492A SPRAIN OF OTHER LIGAMENT OF LEFT ANKLE, INITIAL ENCOUNTER: ICD-10-CM

## 2022-06-15 PROCEDURE — 99072 ADDL SUPL MATRL&STAF TM PHE: CPT

## 2022-06-15 PROCEDURE — 99213 OFFICE O/P EST LOW 20 MIN: CPT

## 2022-06-15 NOTE — ASSESSMENT
[FreeTextEntry1] : After her examination today in the office and review of her radiographs as well as MRI of both the leg as well as the ankle which reveals ankle sprains and today she is mostly tender over the Achilles tendon and posterior tibial tendon as well as the lateral ankle ligamentous structures. I would like her to start physical therapy to work on range of motion strengthening stabilization and local modalities I would also like her to continue to use her supportive lace up ankle brace on a daily basis in a good supportive shoe or sneaker. If she continues to have pain along the proximal aspect of the leg around the knee i would like her to see a knee specialist which i have given her a recommendation for period i would like to see her back after she completes her physical therapy regimen for repeat clinical and X ray examination

## 2022-06-15 NOTE — PHYSICAL EXAM
[de-identified] : General: Well-nourished, well developed female in no apparent distress\par Psych: Clear speech, pleasant mood and affect\par Neurological: Alert and oriented to person, place, and time\par Gait: Antalgic\par Respiratory: Normal respiratory effort\par Skin: No rashes, no lesions, no open skin, no erythema\par \par On examination of the left ankle and foot:\par \par Inspection: Mild swelling without ecchymosis over the medial and  lateral aspect of the ankle. No erythema noted. Hindfoot alignment is anatomic valgus.\par \par Palpation: They are tender over the anterior ankle joint line. Mild tenderness over the anterior lateral ankle over the area of the ATFL and CFL. They are tender medially over the distal tip of the medial malleolus and the deltoid ligament. They are also mildly tender over the lateral malleolus. There is  tenderness over the proximal, mid shaft tibia or fibula or syndesmosis. The leg compartments are soft and nontender. The calf is soft and nontender with a downgoing Espinoza test. There is mild tenderness over the peroneal tendons which are stable. They are tender over the proximal and mid and distal syndesmosis. Pain with compression test of the syndesmosis. Tender over the fibula neck/head. She has pain over the course of the Achilles, or posterior tibial tendons. Non-tender over the sinus tarsi.\par \par On examination of the foot: No tenderness over the transverse tarsal joints, TMT or the Lisfranc joints on palpation and stress examination. No tenderness over the calcaneus, navicular, cuboid, cuneiforms, or metatarsals shafts. Full range of motion through the MTP joints. No pain on examination of the toes.\par \par ROM: 5 degrees of dorsiflexion, 25 degrees of plantar flexion, 15 degrees of inversion and 10 degrees of eversion with discomfort over the lateral ankle.\par \par Muscle Strength: 4+/5 strength with resisted dorsiflexion, plantar flexion, inversion and eversion with pain on resisted eversion.\par \par Stability: No instability noted with varus/valgus stress, with slight laxity of the lateral ligament complex\par \par Tests: Anterior and posterior drawer test negative. Negative syndesmotic squeeze test.\par \par Neurologic Exam Sensation grossly intact to light touch throughout. 2+ Achilles tendon reflexes with downgoing Babinski test, no clonus\par \par Vascular: 2+ dorsalis pedis and posterior tibial pulses with brisk capillary refill in all toes.\par No pitting edema, no varicosities on bilateral lower extremities.\par \par \par

## 2022-06-15 NOTE — HISTORY OF PRESENT ILLNESS
[7] : 7 [5] : 5 [de-identified] : 06/01/22  TRINA DU is a 51 year female who is here today for lt lower leg pain. She states she fell a year ago and had a fracture that didn't get better properly. \par  \par Ms. DU is a 51 year female who presents today for evaluation of their Left leg and left ankle pain and swelling. She states that close to a year ago she fell and injured the proximal aspect of the left leg both in the front and on the side of her left leg and she continues to have sharp pain which she points mostly to the front inside aspect of the proximal leg. She also has diffuse pain in the left ankle as well as diffuse achiness and soreness in the entire left leg. She does have pain also and soreness on the outside aspect of her leg. She feels that two months after her fall she did have a sprain type of injury to the left ankle back in august period she has been using an ankle sleeve period [Dull/Aching] : dull/aching [Localized] : localized [Throbbing] : throbbing [Constant] : constant [] : no [FreeTextEntry1] : Lt  lower leg [FreeTextEntry5] :  TRINA DU is a 51 year female who is here today for lt lower leg pain.  [de-identified] : X Rays [de-identified] : ice, creams

## 2022-07-26 ENCOUNTER — APPOINTMENT (OUTPATIENT)
Dept: NEUROLOGY | Facility: CLINIC | Age: 52
End: 2022-07-26

## 2022-07-26 VITALS
DIASTOLIC BLOOD PRESSURE: 66 MMHG | WEIGHT: 163 LBS | SYSTOLIC BLOOD PRESSURE: 111 MMHG | HEIGHT: 66 IN | BODY MASS INDEX: 26.2 KG/M2 | HEART RATE: 80 BPM

## 2022-07-26 PROCEDURE — 99072 ADDL SUPL MATRL&STAF TM PHE: CPT

## 2022-07-26 PROCEDURE — 99204 OFFICE O/P NEW MOD 45 MIN: CPT

## 2022-07-26 NOTE — PHYSICAL EXAM
[FreeTextEntry1] : Alert and oriented. No cognitive or communication deficits. Visual fields are full to confrontation. . Pupils equal and constrict to light. Extraocular movements intact. No facial asymmetry. Hearing intact to finger rub. Palate rises symmetrically and tongue protrudes in midline. Neck is supple. No bruits heard. No weakness or sensory deficits. Tendon reflexes are all active and symmetric. Plantars are flexor. Gait and coordination intact. Heart sounds are normal. No murmurs heard.\par

## 2022-07-26 NOTE — ASSESSMENT
[FreeTextEntry1] : Patient has improved.  Migraine can start after head trauma.  Advised migraine prophylaxis with coenzyme every 10 200 mg daily.  She can take an NSAID such as Advil or Aleve as needed.  She can contact me if her headaches increase in frequency.  I anticipate she will continue to improve and she will return for follow-up in 1 year.

## 2022-07-26 NOTE — HISTORY OF PRESENT ILLNESS
[FreeTextEntry1] : 51-year-old woman provides history of having 3 concussions.  First occurred 10 years ago and was minor with no loss of consciousness.  2 years later she slipped and her head struck concrete.  No loss of consciousness and the third concussion occurred a year and a half ago when she was at home retrieving items from a crawl space and struck the top of her head.  Several days later she had headaches was dizzy and nauseous.  She sought neurologic opinion with Dr. 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  The second MRI obtained September 23, 2021 was unchanged and the third obtained March 3, 2022 was again unchanged.  Her posttraumatic headaches have gradually improved.  They were some migrainous components associated with the headaches and Dr. Marsh prescribed rizatriptan which patient reports "helped a little".  Patient denies any prior history of migraine.

## 2022-08-04 ENCOUNTER — APPOINTMENT (OUTPATIENT)
Dept: ORTHOPEDIC SURGERY | Facility: CLINIC | Age: 52
End: 2022-08-04

## 2022-08-04 DIAGNOSIS — M79.605 PAIN IN LEFT LEG: ICD-10-CM

## 2022-08-04 DIAGNOSIS — M76.62 ACHILLES TENDINITIS, LEFT LEG: ICD-10-CM

## 2022-08-04 PROCEDURE — 73610 X-RAY EXAM OF ANKLE: CPT | Mod: LT

## 2022-08-04 PROCEDURE — 99213 OFFICE O/P EST LOW 20 MIN: CPT

## 2022-08-04 PROCEDURE — 99072 ADDL SUPL MATRL&STAF TM PHE: CPT

## 2022-08-04 NOTE — ASSESSMENT
[FreeTextEntry1] : After her examination today in the office and review of her previous radiographs she is doing significantly better she feels that she has started to improve with dedicated physical therapy as well as wearing a ankle support brace when she is going to be on her feet for longer periods of time.  I would like her to wear the brace just for long periods of time of walking and standing she does not require the brace at home.  I would like her to continue to work with her physical therapist to work on local modalities including heat ice ultrasound electrical stim and deep tissue massage as well as stretching and strengthening of the gastrocsoleus complex as well as the peroneal and posterior tibial tendons as well as I have demonstrated to her in Achilles tendon stretch that I would like her to perform twice a day.  I have also arranged for her to see a specialist for her left knee which she feels and which she describes as a temperamental and sensitive knee with any misstep she does notice some increased in pain and swelling of her left knee.  I would like to see her back in 4 to 6 weeks for repeat clinical and x-ray examination of the leg ankle and foot.

## 2022-08-04 NOTE — IMAGING
[de-identified] : General: Well-nourished, well developed female in no apparent distress\par Psych: Clear speech, pleasant mood and affect\par Eyes: Extraocular movements intact, no scleral icterus, pupils are symmetric\par Neurological: Alert and oriented to person, place, and time\par Gait: Slight Antalgic\par Respiratory: Normal respiratory effort\par Skin: No rashes, no lesions, no open skin, no ecchymosis, no erythema\par \par Inspection: Hindfoot alignment is in slight valgus. There is mild swelling over the mid-substance to distal Achilles tendon. There is no erythema or induration. Able to perform a single-leg heel rise.\par \par Palpation: There is no anterior ankle joint line tenderness. Tender over the mid-substance and distal Achilles' tendon and over the retrocalcaneal bursa.  She is also tender over the musculotendinous junction of the Achilles tendon and gastrocsoleus muscle bellies without any defect noted.  Her leg compartments are soft and nontense.  There is no tenderness over the insertion of the Achilles tendon. There is no palpable defect within the Achilles tendon. There is a down-going Espinoza test. The resting tension of the Achilles tendon is equal to the contralateral extremity. The calf is soft and nontender. There is no pain over the proximal mid shaft or distal tibia or fibula. The leg compartments are soft and nontender. Nontender over the medial or lateral malleolus or over the course of the peroneal or posterior tibial tendons. No instability or laxity on examination of the ankle or subtalar joints. No tenderness over the sinus tarsi. There is no tenderness over the heel or over the plantar fascia.\par On examination of the foot: There is no tenderness to the transverse tarsal joints, TMT joints or over the Lisfranc joint. There is no tenderness over the navicular, cuboid, cuneiforms or metatarsal shafts. No pain over the webspaces. Full painless range of motion through the MTP joints. No pain on examination of the toes.\par \par ROM: Limited dorsiflexion of 8 degrees, plantar flexion 25 degrees, inversion 15 degrees, eversion 15 degrees. There is no pain or discomfort on active range of motion.\par \par Muscle strength: 4+/5 strength with resisted dorsiflexion, inversion and eversion. 4+/5 strength on resisted plantar flexion with pain\par \par Stability: No instability or laxity on varus and valgus stress. Negative anterior drawer test.\par \par Neurologic: Sensation is grossly intact to light touch throughout. 2+ Achilles tendon reflex, down-going Babinski test, no clonus noted\par \par Vascular: 2+ DP/PT pulses with brisk capillary refill in all of the toes.\par

## 2022-08-04 NOTE — HISTORY OF PRESENT ILLNESS
[de-identified] : Is here for follow-up of her left leg pain and left Achilles tendon pain.  Since her last visit she has been using a supportive ankle brace intermittently she has started physical therapy and she has started to feel slow and steady improvements in her leg and pain in the back of the distal leg and around the Achilles tendon.  She denies any calf pain at this time she denies any feelings of tightness of the leg ankle or foot and she denies any numbness or tingling.  Overall she is improving she does feel less pain she is walking more normally. [FreeTextEntry5] : Micheline is a 51 year old female who is here today to F/U on her LT Ankle.\par Since last visit she states her ankle has been doing slightly better.\par Been going to PT as well.\par That has been going well.\par Went last week. \par Slight swelling as well [de-identified] : Physical Therapy

## 2022-09-14 ENCOUNTER — APPOINTMENT (OUTPATIENT)
Dept: ORTHOPEDIC SURGERY | Facility: CLINIC | Age: 52
End: 2022-09-14

## 2022-09-23 ENCOUNTER — APPOINTMENT (OUTPATIENT)
Dept: ORTHOPEDIC SURGERY | Facility: CLINIC | Age: 52
End: 2022-09-23

## 2022-10-01 ENCOUNTER — NON-APPOINTMENT (OUTPATIENT)
Age: 52
End: 2022-10-01

## 2023-03-30 ENCOUNTER — APPOINTMENT (OUTPATIENT)
Dept: UROLOGY | Facility: CLINIC | Age: 53
End: 2023-03-30
Payer: COMMERCIAL

## 2023-03-30 VITALS
BODY MASS INDEX: 26.36 KG/M2 | OXYGEN SATURATION: 100 % | WEIGHT: 164 LBS | HEIGHT: 66 IN | SYSTOLIC BLOOD PRESSURE: 114 MMHG | TEMPERATURE: 98 F | HEART RATE: 96 BPM | DIASTOLIC BLOOD PRESSURE: 74 MMHG

## 2023-03-30 DIAGNOSIS — R82.71 BACTERIURIA: ICD-10-CM

## 2023-03-30 DIAGNOSIS — R10.9 UNSPECIFIED ABDOMINAL PAIN: ICD-10-CM

## 2023-03-30 PROCEDURE — 51701 INSERT BLADDER CATHETER: CPT

## 2023-03-30 PROCEDURE — 99203 OFFICE O/P NEW LOW 30 MIN: CPT | Mod: 25

## 2023-03-30 PROCEDURE — 99072 ADDL SUPL MATRL&STAF TM PHE: CPT

## 2023-03-30 RX ORDER — COLD-HOT PACK
EACH MISCELLANEOUS
Refills: 0 | Status: ACTIVE | COMMUNITY

## 2023-03-30 RX ORDER — TURMERIC ROOT EXTRACT 500 MG
TABLET ORAL
Refills: 0 | Status: ACTIVE | COMMUNITY

## 2023-03-30 NOTE — REVIEW OF SYSTEMS
[Earache] : earache [Shortness Of Breath] : shortness of breath [Loss of interest] : loss of interest in sexual activity [Date of last menstrual period ____] : date of last menstrual period: [unfilled] [Urine Infection (bladder/kidney)] : bladder/kidney infection [Told you have blood in urine on a urine test] : told blood was present in a urine test [Wake up at night to urinate  How many times?  ___] : wakes up to urinate [unfilled] times during the night [Anxiety] : anxiety [Negative] : Heme/Lymph

## 2023-04-01 LAB
ANION GAP SERPL CALC-SCNC: 11 MMOL/L
APPEARANCE: ABNORMAL
BACTERIA UR CULT: NORMAL
BACTERIA: ABNORMAL
BILIRUBIN URINE: NEGATIVE
BLOOD URINE: ABNORMAL
BUN SERPL-MCNC: 14 MG/DL
CALCIUM SERPL-MCNC: 9.5 MG/DL
CHLORIDE SERPL-SCNC: 100 MMOL/L
CO2 SERPL-SCNC: 27 MMOL/L
COLOR: YELLOW
CREAT SERPL-MCNC: 0.59 MG/DL
EGFR: 110 ML/MIN/1.73M2
GLUCOSE QUALITATIVE U: NEGATIVE
GLUCOSE SERPL-MCNC: 91 MG/DL
HYALINE CASTS: 0 /LPF
KETONES URINE: NEGATIVE
LEUKOCYTE ESTERASE URINE: NEGATIVE
MICROSCOPIC-UA: NORMAL
NITRITE URINE: NEGATIVE
PH URINE: 6.5
POTASSIUM SERPL-SCNC: 3.8 MMOL/L
PROTEIN URINE: NORMAL
RED BLOOD CELLS URINE: 2 /HPF
SODIUM SERPL-SCNC: 138 MMOL/L
SPECIFIC GRAVITY URINE: 1.02
SQUAMOUS EPITHELIAL CELLS: 0 /HPF
UROBILINOGEN URINE: NORMAL
WHITE BLOOD CELLS URINE: 3 /HPF

## 2023-04-04 ENCOUNTER — NON-APPOINTMENT (OUTPATIENT)
Age: 53
End: 2023-04-04

## 2023-04-10 ENCOUNTER — APPOINTMENT (OUTPATIENT)
Dept: UROLOGY | Facility: CLINIC | Age: 53
End: 2023-04-10
Payer: COMMERCIAL

## 2023-04-10 VITALS
BODY MASS INDEX: 26.47 KG/M2 | OXYGEN SATURATION: 97 % | SYSTOLIC BLOOD PRESSURE: 102 MMHG | HEART RATE: 81 BPM | TEMPERATURE: 97.8 F | WEIGHT: 164 LBS | DIASTOLIC BLOOD PRESSURE: 68 MMHG

## 2023-04-10 DIAGNOSIS — R30.0 DYSURIA: ICD-10-CM

## 2023-04-10 PROCEDURE — 99072 ADDL SUPL MATRL&STAF TM PHE: CPT

## 2023-04-10 PROCEDURE — 99212 OFFICE O/P EST SF 10 MIN: CPT | Mod: 25

## 2023-04-10 PROCEDURE — 52000 CYSTOURETHROSCOPY: CPT

## 2023-04-10 NOTE — HISTORY OF PRESENT ILLNESS
"Chief Complaint   Patient presents with     [facility] F/U     Health Maintenance     A1c, Microalbumin,        Initial /82  Pulse 102  Temp 97.8  F (36.6  C) (Oral)  Wt 165 lb (74.8 kg)  SpO2 99%  BMI 25.6 kg/m2 Estimated body mass index is 25.6 kg/(m^2) as calculated from the following:    Height as of 8/21/17: 5' 7.32\" (1.71 m).    Weight as of this encounter: 165 lb (74.8 kg).  Medication Reconciliation: complete    "
[FreeTextEntry1] : TRINA DU is a 52 year old F who presents with microscopic hematuria w negative cx's x2.\par \par No GH, but now reporting h/o childhood UTI's requiring dilation.Says it helped a bit.\par Then was fine and later developed a lot of UTIs in her 20's when she became sexually active.\par \par Now no pain, but states bad odor to urine. Says she drinks a lot of fluid, though urine appears concentrated.\par On our UA she had some bacteria, but culture was negative. She inquired about this.  Explained that it can be contaminated, poorly collected or from marissa urethral kirsten.

## 2023-04-10 NOTE — ASSESSMENT
[FreeTextEntry1] : Pt bladder was definitely not inflamed or red appearing.\par \par She had a completely normal cystoscopy, and now to complete the microscopic hematuria work up, patient who was a former smoker, will require a CT urogram.\par \par Rx for NORTHWELL given.\par \par Urine from scope obtained for repeat micro UA and cx\par \par Instructed to hydrate to 2.5 L or more daily.\par Also, odor could be vaginal or due to changes from menopause, vag atrophy etc.

## 2023-04-11 PROBLEM — R82.71 BACILLURIA: Status: ACTIVE | Noted: 2023-04-10

## 2023-04-11 LAB
APPEARANCE: CLEAR
BACTERIA: ABNORMAL
BILIRUBIN URINE: NEGATIVE
BLOOD URINE: ABNORMAL
COLOR: YELLOW
GLUCOSE QUALITATIVE U: NEGATIVE
HYALINE CASTS: 0 /LPF
KETONES URINE: NEGATIVE
LEUKOCYTE ESTERASE URINE: NEGATIVE
MICROSCOPIC-UA: NORMAL
NITRITE URINE: NEGATIVE
PH URINE: 6
PROTEIN URINE: NEGATIVE
RED BLOOD CELLS URINE: 2 /HPF
SPECIFIC GRAVITY URINE: 1.01
SQUAMOUS EPITHELIAL CELLS: 0 /HPF
UROBILINOGEN URINE: NORMAL
WHITE BLOOD CELLS URINE: 1 /HPF

## 2023-04-11 NOTE — ASSESSMENT
[FreeTextEntry1] : Explained bacilluria vs a UTI: contaminants, mixed species, colonization.  Need for pyuria, WBC and positive cultures. \par \par Explained need for microscopic analysis, and what that work up would entail. \par Not sure why her urine smells like vinegar but can be related to supplements, diet, concentrated urine, etc.\par \par A catheterized specimen was obtained to r/o infection. \par \par 36 min and separate from catheterization\par

## 2023-04-11 NOTE — HISTORY OF PRESENT ILLNESS
[FreeTextEntry1] : TRINA DU is a 52 year old F who presents with "vinegar smell" to her urine and some left lower back pain. Also, having pressure and increased frequency.\par \par On UA done 3/9/23 there were 8 RBC and a neg culture.\par \par No h/o gross hematuria, stones; denies childhood, febrile or complicated UTI.\par No AUR\par \par No periods in 4 mo\par \par Rare leakage: JERE \par only a few gtts\par \par not sexually active\par \par M w colon ca, dx'ed 1 mo ago at age 87 yo\par \par *pt due for one in 5/23, but had a normal one 5 yrs ago due to h/o diverticulitis\par No BRBPR\par \par Has been on Naproxen bid due to lt ankle sprain: took it for a yr\par

## 2023-04-12 LAB — BACTERIA UR CULT: NORMAL

## 2023-04-19 ENCOUNTER — APPOINTMENT (OUTPATIENT)
Dept: MAMMOGRAPHY | Facility: CLINIC | Age: 53
End: 2023-04-19
Payer: COMMERCIAL

## 2023-04-19 ENCOUNTER — OUTPATIENT (OUTPATIENT)
Dept: OUTPATIENT SERVICES | Facility: HOSPITAL | Age: 53
LOS: 1 days | End: 2023-04-19
Payer: COMMERCIAL

## 2023-04-19 DIAGNOSIS — Z00.8 ENCOUNTER FOR OTHER GENERAL EXAMINATION: ICD-10-CM

## 2023-04-19 PROCEDURE — 77067 SCR MAMMO BI INCL CAD: CPT | Mod: 26

## 2023-04-19 PROCEDURE — 77067 SCR MAMMO BI INCL CAD: CPT

## 2023-04-19 PROCEDURE — 77063 BREAST TOMOSYNTHESIS BI: CPT

## 2023-04-19 PROCEDURE — 77063 BREAST TOMOSYNTHESIS BI: CPT | Mod: 26

## 2023-04-26 ENCOUNTER — APPOINTMENT (OUTPATIENT)
Dept: CT IMAGING | Facility: CLINIC | Age: 53
End: 2023-04-26
Payer: COMMERCIAL

## 2023-04-26 ENCOUNTER — OUTPATIENT (OUTPATIENT)
Dept: OUTPATIENT SERVICES | Facility: HOSPITAL | Age: 53
LOS: 1 days | End: 2023-04-26
Payer: COMMERCIAL

## 2023-04-26 DIAGNOSIS — R31.29 OTHER MICROSCOPIC HEMATURIA: ICD-10-CM

## 2023-04-26 PROCEDURE — 74178 CT ABD&PLV WO CNTR FLWD CNTR: CPT | Mod: 26

## 2023-04-26 PROCEDURE — 74178 CT ABD&PLV WO CNTR FLWD CNTR: CPT

## 2023-05-23 ENCOUNTER — APPOINTMENT (OUTPATIENT)
Dept: INTERNAL MEDICINE | Facility: CLINIC | Age: 53
End: 2023-05-23
Payer: COMMERCIAL

## 2023-05-23 ENCOUNTER — NON-APPOINTMENT (OUTPATIENT)
Age: 53
End: 2023-05-23

## 2023-05-23 ENCOUNTER — LABORATORY RESULT (OUTPATIENT)
Age: 53
End: 2023-05-23

## 2023-05-23 VITALS
WEIGHT: 160 LBS | TEMPERATURE: 98 F | OXYGEN SATURATION: 98 % | HEIGHT: 66 IN | RESPIRATION RATE: 16 BRPM | SYSTOLIC BLOOD PRESSURE: 90 MMHG | BODY MASS INDEX: 25.71 KG/M2 | DIASTOLIC BLOOD PRESSURE: 59 MMHG | HEART RATE: 78 BPM

## 2023-05-23 VITALS — SYSTOLIC BLOOD PRESSURE: 108 MMHG | DIASTOLIC BLOOD PRESSURE: 74 MMHG

## 2023-05-23 DIAGNOSIS — Z87.898 PERSONAL HISTORY OF OTHER SPECIFIED CONDITIONS: ICD-10-CM

## 2023-05-23 DIAGNOSIS — Z80.8 FAMILY HISTORY OF MALIGNANT NEOPLASM OF OTHER ORGANS OR SYSTEMS: ICD-10-CM

## 2023-05-23 DIAGNOSIS — Z80.3 FAMILY HISTORY OF MALIGNANT NEOPLASM OF BREAST: ICD-10-CM

## 2023-05-23 DIAGNOSIS — Z76.89 PERSONS ENCOUNTERING HEALTH SERVICES IN OTHER SPECIFIED CIRCUMSTANCES: ICD-10-CM

## 2023-05-23 DIAGNOSIS — R07.0 PAIN IN THROAT: ICD-10-CM

## 2023-05-23 DIAGNOSIS — Z80.0 FAMILY HISTORY OF MALIGNANT NEOPLASM OF DIGESTIVE ORGANS: ICD-10-CM

## 2023-05-23 DIAGNOSIS — K57.90 DIVERTICULOSIS OF INTESTINE, PART UNSPECIFIED, W/OUT PERFORATION OR ABSCESS W/OUT BLEEDING: ICD-10-CM

## 2023-05-23 DIAGNOSIS — D25.9 LEIOMYOMA OF UTERUS, UNSPECIFIED: ICD-10-CM

## 2023-05-23 DIAGNOSIS — H61.23 IMPACTED CERUMEN, BILATERAL: ICD-10-CM

## 2023-05-23 DIAGNOSIS — J32.9 CHRONIC SINUSITIS, UNSPECIFIED: ICD-10-CM

## 2023-05-23 DIAGNOSIS — Z00.00 ENCOUNTER FOR GENERAL ADULT MEDICAL EXAMINATION W/OUT ABNORMAL FINDINGS: ICD-10-CM

## 2023-05-23 PROCEDURE — 93000 ELECTROCARDIOGRAM COMPLETE: CPT

## 2023-05-23 PROCEDURE — 99204 OFFICE O/P NEW MOD 45 MIN: CPT | Mod: 25

## 2023-05-23 RX ORDER — MELOXICAM 15 MG/1
15 TABLET ORAL
Qty: 30 | Refills: 0 | Status: DISCONTINUED | COMMUNITY
Start: 2020-11-23 | End: 2023-05-23

## 2023-05-23 RX ORDER — UBIDECARENONE 200 MG
CAPSULE ORAL
Refills: 0 | Status: ACTIVE | COMMUNITY

## 2023-05-23 RX ORDER — MELOXICAM 15 MG/1
15 TABLET ORAL DAILY
Qty: 30 | Refills: 0 | Status: DISCONTINUED | COMMUNITY
Start: 2022-06-15 | End: 2023-05-23

## 2023-05-23 RX ORDER — NIACINAMIDE 500 MG
TABLET ORAL
Refills: 0 | Status: DISCONTINUED | COMMUNITY
End: 2023-05-23

## 2023-05-23 RX ORDER — NAPROXEN 500 MG/1
500 TABLET ORAL
Refills: 0 | Status: DISCONTINUED | COMMUNITY
End: 2023-05-23

## 2023-05-23 RX ORDER — MOMETASONE FUROATE 1 MG/G
0.1 CREAM TOPICAL DAILY
Qty: 1 | Refills: 0 | Status: DISCONTINUED | COMMUNITY
Start: 2022-04-08 | End: 2023-05-23

## 2023-05-23 RX ORDER — ROSUVASTATIN CALCIUM 5 MG/1
5 TABLET, FILM COATED ORAL
Refills: 0 | Status: DISCONTINUED | COMMUNITY
End: 2023-05-23

## 2023-05-23 RX ORDER — FLUTICASONE PROPIONATE 50 MCG
50 SPRAY, SUSPENSION NASAL
Refills: 0 | Status: DISCONTINUED | COMMUNITY
End: 2023-05-23

## 2023-05-24 ENCOUNTER — APPOINTMENT (OUTPATIENT)
Dept: CARDIOLOGY | Facility: CLINIC | Age: 53
End: 2023-05-24
Payer: COMMERCIAL

## 2023-05-24 ENCOUNTER — TRANSCRIPTION ENCOUNTER (OUTPATIENT)
Age: 53
End: 2023-05-24

## 2023-05-24 VITALS
OXYGEN SATURATION: 99 % | SYSTOLIC BLOOD PRESSURE: 112 MMHG | HEIGHT: 66 IN | BODY MASS INDEX: 26.2 KG/M2 | WEIGHT: 163 LBS | HEART RATE: 79 BPM | DIASTOLIC BLOOD PRESSURE: 74 MMHG

## 2023-05-24 DIAGNOSIS — R94.31 ABNORMAL ELECTROCARDIOGRAM [ECG] [EKG]: ICD-10-CM

## 2023-05-24 DIAGNOSIS — R07.9 CHEST PAIN, UNSPECIFIED: ICD-10-CM

## 2023-05-24 DIAGNOSIS — Z87.898 PERSONAL HISTORY OF OTHER SPECIFIED CONDITIONS: ICD-10-CM

## 2023-05-24 PROCEDURE — 99203 OFFICE O/P NEW LOW 30 MIN: CPT

## 2023-05-24 RX ORDER — IPRATROPIUM BROMIDE 42 UG/1
0.06 SPRAY NASAL
Qty: 15 | Refills: 0 | Status: ACTIVE | COMMUNITY
Start: 2023-04-04

## 2023-05-24 RX ORDER — POLYETHYLENE GLYCOL-3350 AND ELECTROLYTES 236; 6.74; 5.86; 2.97; 22.74 G/274.31G; G/274.31G; G/274.31G; G/274.31G; G/274.31G
236 POWDER, FOR SOLUTION ORAL
Qty: 4000 | Refills: 0 | Status: ACTIVE | COMMUNITY
Start: 2023-05-03

## 2023-05-24 NOTE — HISTORY OF PRESENT ILLNESS
[FreeTextEntry1] : TRINA DU is a 52 year old female referred for consultation regarding abn ECG and chest pain.\par She complains of left sided chest pain that is essentially constant, waxing and waning, of recent onset.\par No ppt, exacerbating, or alleviating factors. \par She has history of ankle sprain and had been on NSAID for better part of a year.\par \par Soc hist - non-smoker.  \par Fam hist - mother - AF.\par

## 2023-05-24 NOTE — REASON FOR VISIT
[Symptom and Test Evaluation] : symptom and test evaluation [Arrhythmia/ECG Abnorrmalities] : arrhythmia/ECG abnormalities [FreeTextEntry3] : Dr Yulissa Sutton

## 2023-05-24 NOTE — DISCUSSION/SUMMARY
[FreeTextEntry1] : Echocardiogram\par Ex stress test\par Reassurance.\par Recommended otc NSAID for 3-4 days.\par

## 2023-05-30 ENCOUNTER — APPOINTMENT (OUTPATIENT)
Dept: INTERNAL MEDICINE | Facility: CLINIC | Age: 53
End: 2023-05-30
Payer: COMMERCIAL

## 2023-05-30 ENCOUNTER — APPOINTMENT (OUTPATIENT)
Dept: CT IMAGING | Facility: CLINIC | Age: 53
End: 2023-05-30
Payer: COMMERCIAL

## 2023-05-30 ENCOUNTER — OUTPATIENT (OUTPATIENT)
Dept: OUTPATIENT SERVICES | Facility: HOSPITAL | Age: 53
LOS: 1 days | End: 2023-05-30
Payer: COMMERCIAL

## 2023-05-30 DIAGNOSIS — Z00.8 ENCOUNTER FOR OTHER GENERAL EXAMINATION: ICD-10-CM

## 2023-05-30 DIAGNOSIS — Z87.891 PERSONAL HISTORY OF NICOTINE DEPENDENCE: ICD-10-CM

## 2023-05-30 LAB
T3 SERPL-MCNC: 116 NG/DL
T3RU NFR SERPL: 1.2 TBI
T4 FREE SERPL-MCNC: 0.8 NG/DL
THYROGLOB AB SERPL-ACNC: <20 IU/ML
THYROPEROXIDASE AB SERPL IA-ACNC: 35.7 IU/ML
TSH RECEPTOR AB: <1.1 IU/L
TSH SERPL-ACNC: 1.75 UIU/ML

## 2023-05-30 PROCEDURE — 99443: CPT

## 2023-05-30 PROCEDURE — 71250 CT THORAX DX C-: CPT | Mod: 26

## 2023-05-30 PROCEDURE — 71250 CT THORAX DX C-: CPT

## 2023-06-01 ENCOUNTER — APPOINTMENT (OUTPATIENT)
Dept: PULMONOLOGY | Facility: CLINIC | Age: 53
End: 2023-06-01

## 2023-06-01 DIAGNOSIS — R59.0 LOCALIZED ENLARGED LYMPH NODES: ICD-10-CM

## 2023-06-02 PROBLEM — R59.0 LYMPHADENOPATHY, RETROPERITONEAL: Status: ACTIVE | Noted: 2023-06-02

## 2023-06-08 ENCOUNTER — APPOINTMENT (OUTPATIENT)
Dept: PULMONOLOGY | Facility: CLINIC | Age: 53
End: 2023-06-08
Payer: COMMERCIAL

## 2023-06-08 VITALS
SYSTOLIC BLOOD PRESSURE: 116 MMHG | WEIGHT: 165.6 LBS | HEART RATE: 77 BPM | RESPIRATION RATE: 16 BRPM | TEMPERATURE: 98.1 F | OXYGEN SATURATION: 96 % | DIASTOLIC BLOOD PRESSURE: 60 MMHG | HEIGHT: 66 IN | BODY MASS INDEX: 26.61 KG/M2

## 2023-06-08 DIAGNOSIS — R91.1 SOLITARY PULMONARY NODULE: ICD-10-CM

## 2023-06-08 DIAGNOSIS — Z87.891 PERSONAL HISTORY OF NICOTINE DEPENDENCE: ICD-10-CM

## 2023-06-08 PROCEDURE — 99203 OFFICE O/P NEW LOW 30 MIN: CPT

## 2023-06-08 NOTE — PHYSICAL EXAM
[No Acute Distress] : no acute distress [Well Nourished] : well nourished [Well Developed] : well developed [Normal Appearance] : normal appearance [Normal Rate/Rhythm] : normal rate/rhythm [Normal S1, S2] : normal s1, s2 [No Murmurs] : no murmurs [No Resp Distress] : no resp distress [Clear to Auscultation Bilaterally] : clear to auscultation bilaterally [Benign] : benign [Not Tender] : not tender [No Masses] : no masses [No Clubbing] : no clubbing [No Edema] : no edema [No Focal Deficits] : no focal deficits [Oriented x3] : oriented x3 [Normal Oropharynx] : normal oropharynx [I] : Mallampati Class: I [TextBox_80] : mild pectus

## 2023-06-08 NOTE — HISTORY OF PRESENT ILLNESS
[Former] : former [Never] : never [TextBox_4] : 52 year old patient presents for evaluation of LLL nodules\par \par She has  history of left sided back pain. She also has diverticulosis and had CT abdomen that demonstrated a RLL 5 mm nodule.  CT chest confirmed this but nodule was also found to be preexisting and stable from 2013, seen on a CT abdomen performed at the time\par \par \par CT chest was remarkable for mild pectus deformity with diminished AP diameter.  there was no lymphadenopathy, interstitial lung disease or other suspicious nodules\par \par \par \par She is having work up for intraabdominal enlarged retroperitoneal lymph nodes and diverticulosis\par \par She has had sinus problems treated with Flonase okay great thank you for possible\par \par \par \par \par \par \par Primary doctor is Dr Sutton\par \par \par PSH:\par tonsils\par \par \par \par PMH:\par \par diverticulosis\par \par HLD\par \par sinusitis used Flonase, prolonged course\par \par \par \par \par SH:\par \par former smoker, social\par \par \par ETOH:  occasional\par \par \par Occupation: \par \par No exposure to chemicals, dust, asbestos, mold\par \par \par \par \par ALLERGY:\par \par NKDA\par \par \par environmental/seasonal allergy: possible\par \par \par \par Review of Systems:\par \par No rash, skin problems\par No dry eyes\par no mouth ulcers\par no dysphagia\par no dry mouth\par \par no asthma\par \par \par no pneumonia\par no wheeze\par no lung cancer\par \par no CAD\par no MI\par no chest pain\par no murmur\par no CHF\par no HTN\par no edema\par \par no peptic ulcer or gastritis\par no GERD\par no abdominal pain\par no liver disease\par \par no Diabetes\par no thyroid disease\par \par \par no bleeding\par \par no DVT or PE\par \par no kidney disease\par \par no stroke\par no seizure\par \par \par \par \par \par \par \par \par \par \par \par   [YearQuit] : 40+ [TextBox_22] : very little in past, social [Snoring] : no snoring [Unintentional Sleep while Active] : no unintentional sleep while active [Witnessed Apneas] : no witnessed apneas

## 2023-06-08 NOTE — DISCUSSION/SUMMARY
[FreeTextEntry1] : 52 year old woman who has a chronic 5 mm RLL nodule stable at least fro 2013.  This does not require follow up\par \par The back pain is likely musculoskeletal\par \par She will have workup for intraabdominal enlarged lymph nodes\par \par she has pectus deformity that does not require intervention\par \par She may continue to use Flonase for nasal congestion.  She will see ENT\par \par Neo Mayers MD

## 2023-06-13 ENCOUNTER — APPOINTMENT (OUTPATIENT)
Dept: CARDIOLOGY | Facility: CLINIC | Age: 53
End: 2023-06-13

## 2023-06-21 ENCOUNTER — TRANSCRIPTION ENCOUNTER (OUTPATIENT)
Age: 53
End: 2023-06-21

## 2023-06-29 ENCOUNTER — APPOINTMENT (OUTPATIENT)
Dept: OTOLARYNGOLOGY | Facility: CLINIC | Age: 53
End: 2023-06-29

## 2023-07-06 ENCOUNTER — APPOINTMENT (OUTPATIENT)
Dept: FAMILY MEDICINE | Facility: CLINIC | Age: 53
End: 2023-07-06

## 2023-07-19 PROBLEM — R59.9 ADENOPATHY: Status: ACTIVE | Noted: 2023-07-19

## 2023-07-20 ENCOUNTER — APPOINTMENT (OUTPATIENT)
Dept: SURGICAL ONCOLOGY | Facility: CLINIC | Age: 53
End: 2023-07-20
Payer: COMMERCIAL

## 2023-07-20 ENCOUNTER — NON-APPOINTMENT (OUTPATIENT)
Age: 53
End: 2023-07-20

## 2023-07-20 VITALS
HEIGHT: 66 IN | OXYGEN SATURATION: 96 % | DIASTOLIC BLOOD PRESSURE: 64 MMHG | RESPIRATION RATE: 16 BRPM | SYSTOLIC BLOOD PRESSURE: 95 MMHG | BODY MASS INDEX: 26.2 KG/M2 | HEART RATE: 84 BPM | WEIGHT: 163 LBS

## 2023-07-20 DIAGNOSIS — R59.9 ENLARGED LYMPH NODES, UNSPECIFIED: ICD-10-CM

## 2023-07-20 PROCEDURE — 99244 OFF/OP CNSLTJ NEW/EST MOD 40: CPT

## 2023-07-27 ENCOUNTER — APPOINTMENT (OUTPATIENT)
Dept: NEUROLOGY | Facility: CLINIC | Age: 53
End: 2023-07-27

## 2023-10-17 NOTE — ED PROVIDER NOTE - CHPI ED SYMPTOMS POS
----- Message from PHILLY Cárdenas sent at 10/16/2023  4:02 PM CDT -----  Notify normal thyroid lab. CMP shows mildly low potassium. Encourage Potassium rich foods such as potato skins, orange juice, green leafy veggies. I still want her to ge [place] of thyroid. Thanks   fatigue difficulty concentrating dizziness tinging/HEADACHE

## 2023-12-13 ENCOUNTER — APPOINTMENT (OUTPATIENT)
Dept: OTOLARYNGOLOGY | Facility: CLINIC | Age: 53
End: 2023-12-13
Payer: COMMERCIAL

## 2023-12-13 VITALS
WEIGHT: 160 LBS | HEART RATE: 77 BPM | HEIGHT: 66 IN | BODY MASS INDEX: 25.71 KG/M2 | SYSTOLIC BLOOD PRESSURE: 91 MMHG | OXYGEN SATURATION: 97 % | DIASTOLIC BLOOD PRESSURE: 65 MMHG

## 2023-12-13 DIAGNOSIS — H61.23 IMPACTED CERUMEN, BILATERAL: ICD-10-CM

## 2023-12-13 DIAGNOSIS — H93.8X3 OTHER SPECIFIED DISORDERS OF EAR, BILATERAL: ICD-10-CM

## 2023-12-13 DIAGNOSIS — J34.2 DEVIATED NASAL SEPTUM: ICD-10-CM

## 2023-12-13 DIAGNOSIS — L29.9 PRURITUS, UNSPECIFIED: ICD-10-CM

## 2023-12-13 PROCEDURE — 31231 NASAL ENDOSCOPY DX: CPT

## 2023-12-13 PROCEDURE — 99214 OFFICE O/P EST MOD 30 MIN: CPT | Mod: 25

## 2023-12-13 PROCEDURE — 69210 REMOVE IMPACTED EAR WAX UNI: CPT

## 2023-12-13 RX ORDER — FLUTICASONE PROPIONATE 50 UG/1
50 SPRAY, METERED NASAL DAILY
Qty: 3 | Refills: 3 | Status: ACTIVE | COMMUNITY
Start: 2023-12-13 | End: 1900-01-01

## 2023-12-13 RX ORDER — OFLOXACIN OTIC 3 MG/ML
0.3 SOLUTION AURICULAR (OTIC)
Qty: 1 | Refills: 1 | Status: ACTIVE | COMMUNITY
Start: 2023-12-13 | End: 1900-01-01

## 2023-12-13 NOTE — ASSESSMENT
[FreeTextEntry1] : Patient follows up last seen about a year and a half ago ears are bothering her significant amount of cerumen curetted out normal tympanic membranes complains that her breathing her nose is somewhat compromised at times endoscopically she does have a deviated septum no tumors masses or polyps encouraged her to use Flonase nasal spray every day for a month a month on a month off if this does not help her breathing unfortunately she needs to consider the possibility of a septoplasty in the future she will follow-up and see us as needed.

## 2023-12-13 NOTE — PHYSICAL EXAM
[Midline] : trachea located in midline position [Normal] : no rashes [Nasal Endoscopy Performed] : nasal endoscopy was performed, see procedure section for findings [] : septum deviated to the right [de-identified] : cerumen in the ear canals; once removed normal EACs

## 2023-12-13 NOTE — REVIEW OF SYSTEMS
[Negative] : Heme/Lymph [Ear Itch] : ear itch [As Noted in HPI] : as noted in HPI [Nasal Congestion] : nasal congestion

## 2023-12-13 NOTE — HISTORY OF PRESENT ILLNESS
[de-identified] : Patient feels her ears are clogged bilaterally and the left ear is bothering her lately. She used to come once a year but was last here about a year and a half ago. She denies pain in the ears but the left ear is muffled with mild pressure. She denies changes in hearing or ringing in the ears. She does also have itching in the left ear  SHe also complains that at night she feels she cannot breathe well through her nose. SHe uses saline at night and FLonase that helps

## 2023-12-28 ENCOUNTER — APPOINTMENT (OUTPATIENT)
Dept: INTERNAL MEDICINE | Facility: CLINIC | Age: 53
End: 2023-12-28
Payer: COMMERCIAL

## 2023-12-28 VITALS
TEMPERATURE: 98.2 F | HEART RATE: 85 BPM | OXYGEN SATURATION: 96 % | HEIGHT: 66 IN | BODY MASS INDEX: 25.71 KG/M2 | RESPIRATION RATE: 16 BRPM | DIASTOLIC BLOOD PRESSURE: 70 MMHG | WEIGHT: 160 LBS | SYSTOLIC BLOOD PRESSURE: 102 MMHG

## 2023-12-28 DIAGNOSIS — R76.8 OTHER SPECIFIED ABNORMAL IMMUNOLOGICAL FINDINGS IN SERUM: ICD-10-CM

## 2023-12-28 DIAGNOSIS — R21 RASH AND OTHER NONSPECIFIC SKIN ERUPTION: ICD-10-CM

## 2023-12-28 DIAGNOSIS — R79.89 OTHER SPECIFIED ABNORMAL FINDINGS OF BLOOD CHEMISTRY: ICD-10-CM

## 2023-12-28 DIAGNOSIS — E78.5 HYPERLIPIDEMIA, UNSPECIFIED: ICD-10-CM

## 2023-12-28 DIAGNOSIS — C44.91 BASAL CELL CARCINOMA OF SKIN, UNSPECIFIED: ICD-10-CM

## 2023-12-28 PROCEDURE — 99214 OFFICE O/P EST MOD 30 MIN: CPT

## 2023-12-29 LAB
ALBUMIN SERPL ELPH-MCNC: 5 G/DL
ALP BLD-CCNC: 88 U/L
ALT SERPL-CCNC: 20 U/L
ANION GAP SERPL CALC-SCNC: 15 MMOL/L
AST SERPL-CCNC: 20 U/L
BILIRUB SERPL-MCNC: 0.2 MG/DL
BUN SERPL-MCNC: 13 MG/DL
CALCIUM SERPL-MCNC: 10.3 MG/DL
CHLORIDE SERPL-SCNC: 100 MMOL/L
CHOLEST SERPL-MCNC: 219 MG/DL
CO2 SERPL-SCNC: 26 MMOL/L
CREAT SERPL-MCNC: 0.53 MG/DL
EGFR: 112 ML/MIN/1.73M2
GLUCOSE SERPL-MCNC: 98 MG/DL
HDLC SERPL-MCNC: 62 MG/DL
LDLC SERPL CALC-MCNC: 115 MG/DL
NONHDLC SERPL-MCNC: 158 MG/DL
POTASSIUM SERPL-SCNC: 3.8 MMOL/L
PROT SERPL-MCNC: 7.4 G/DL
SODIUM SERPL-SCNC: 141 MMOL/L
T4 FREE SERPL-MCNC: 0.9 NG/DL
TRIGL SERPL-MCNC: 244 MG/DL
TSH SERPL-ACNC: 1.54 UIU/ML

## 2024-01-16 ENCOUNTER — APPOINTMENT (OUTPATIENT)
Dept: ALLERGY | Facility: CLINIC | Age: 54
End: 2024-01-16
Payer: COMMERCIAL

## 2024-01-16 ENCOUNTER — NON-APPOINTMENT (OUTPATIENT)
Age: 54
End: 2024-01-16

## 2024-01-16 VITALS
SYSTOLIC BLOOD PRESSURE: 106 MMHG | DIASTOLIC BLOOD PRESSURE: 72 MMHG | TEMPERATURE: 98.3 F | HEART RATE: 106 BPM | OXYGEN SATURATION: 97 %

## 2024-01-16 PROCEDURE — 99203 OFFICE O/P NEW LOW 30 MIN: CPT

## 2024-01-16 NOTE — ASSESSMENT
[FreeTextEntry1] : Papular eruption c/w localized bug bites  I have recommended that Mrs. Tobias treat any subsequent rash with triamcinolone cream TID until resolution of her symptoms and not to use OTC hydrocortisone cream  RV any recurrent symptoms.

## 2024-01-16 NOTE — HISTORY OF PRESENT ILLNESS
[de-identified] : Over the summer months she had a bunch of bites on her arms and she developed a rash on her arms.   She saw dermatologist and treated with medication with improvement in her symptoms.   One month later she had a recurrence of the rash after being exposed to some bushes in her backyard.   About 1 month ago she again had a similar itchy rash on her arm - resolved with OTC hydrocortisone cream.   Patient born in [place] - living [place].    No skin biopsy performed.   Records from dermatologist reviewed

## 2024-01-16 NOTE — SOCIAL HISTORY
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

## 2024-01-16 NOTE — PHYSICAL EXAM
[Alert] : alert [No Acute Distress] : no acute distress [Normal Rate and Effort] : normal respiratory rhythm and effort [No Crackles] : no crackles [No Retractions] : no retractions [Wheezing] : no wheezing was heard [Patches] : no patches [No clubbing] : no clubbing [No Cyanosis] : no cyanosis [Normal Mood] : mood was normal [Judgment and Insight Age Appropriate] : judgement and insight is age appropriate

## 2024-01-29 ENCOUNTER — RESULT REVIEW (OUTPATIENT)
Age: 54
End: 2024-01-29

## 2024-01-31 ENCOUNTER — APPOINTMENT (OUTPATIENT)
Dept: CT IMAGING | Facility: CLINIC | Age: 54
End: 2024-01-31
Payer: COMMERCIAL

## 2024-01-31 ENCOUNTER — OUTPATIENT (OUTPATIENT)
Dept: OUTPATIENT SERVICES | Facility: HOSPITAL | Age: 54
LOS: 1 days | End: 2024-01-31
Payer: COMMERCIAL

## 2024-01-31 DIAGNOSIS — Z00.8 ENCOUNTER FOR OTHER GENERAL EXAMINATION: ICD-10-CM

## 2024-01-31 DIAGNOSIS — R59.9 ENLARGED LYMPH NODES, UNSPECIFIED: ICD-10-CM

## 2024-01-31 PROCEDURE — 71260 CT THORAX DX C+: CPT

## 2024-01-31 PROCEDURE — 71260 CT THORAX DX C+: CPT | Mod: 26

## 2024-01-31 PROCEDURE — 74177 CT ABD & PELVIS W/CONTRAST: CPT

## 2024-01-31 PROCEDURE — 74177 CT ABD & PELVIS W/CONTRAST: CPT | Mod: 26

## 2024-02-18 ENCOUNTER — OUTPATIENT (OUTPATIENT)
Dept: OUTPATIENT SERVICES | Facility: HOSPITAL | Age: 54
LOS: 1 days | End: 2024-02-18
Payer: COMMERCIAL

## 2024-02-18 ENCOUNTER — APPOINTMENT (OUTPATIENT)
Dept: MRI IMAGING | Facility: CLINIC | Age: 54
End: 2024-02-18
Payer: COMMERCIAL

## 2024-02-18 DIAGNOSIS — Z00.8 ENCOUNTER FOR OTHER GENERAL EXAMINATION: ICD-10-CM

## 2024-02-18 DIAGNOSIS — R59.9 ENLARGED LYMPH NODES, UNSPECIFIED: ICD-10-CM

## 2024-02-18 PROCEDURE — A9585: CPT

## 2024-02-18 PROCEDURE — 74183 MRI ABD W/O CNTR FLWD CNTR: CPT

## 2024-02-18 PROCEDURE — 74183 MRI ABD W/O CNTR FLWD CNTR: CPT | Mod: 26

## 2024-02-26 NOTE — REASON FOR VISIT
[Initial Consultation] : an initial consultation for [Other: _____] : [unfilled] [FreeTextEntry2] : Retroperitoneal adenopathy on CT scan of the abdomen and pelvis performed to evaluate microscopic hematuria

## 2024-02-26 NOTE — HISTORY OF PRESENT ILLNESS
[de-identified] : 52-year-old lady.\par \par Referred by her internist: Dr. 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 Nehemiah GODINEZ, 2022 visit was unremarkable.\par \par \par + Family history of malignancy:\par Mother: CRC.\par Half sister: Breast cancer.\par Father: Bone cancer of his spine.\par Paternal grandfather: History of bone cancer\par \par \par PMD: Dr. Yulissa YANEZ.\par \par NKDA.\par \par No pacemaker or defibrillator.\par No anticoagulants.\par \par + Atypical chest pain.\par Cardiology: Dr. Yvan ROBLERO.\par May 2023 evaluation was unremarkable.\par \par + Hypercholesterolemia.\par She takes daily simvastatin.\par \par + RLL 5 mm pulmonary nodule, stable since .\par Followed with serial imaging.\par May 2023 study: No changes noted.\par Pulmonary medicine: Dr. Neo SCHREIBER.\par \par + Chronic sinusitis.\par ENT: Dr. Dmitry FRANK\par \par + History of postconcussive syndrome.\par Neurology: Dr. Harpreet BONILLA.\par \par Left Achilles tendinitis.\par Orthopedics: Dr. Jg FERNÁNDEZ\par \par + Microscopic hematuria.\par Urology: Dr. Shahrzad ESCAMILLA's approval disapproval\par \par \par GYN: Dr. Milvia MORA.\par 2023 visit was unremarkable.\par \par Menarche at 15.\par  3, para 2, first at 43.\par No exogenous hormones.\par \par 2023:\par Bilateral mammogram at [facility] was normal\par \par \par 2023:\par Colonoscopy okay x5 years:\par Diverticulosis, melanosis.\par GI: Dr. Yaw FALLON.\par + FH:\par Mother: CRC

## 2024-02-26 NOTE — REVIEW OF SYSTEMS
[Negative] : Endocrine [FreeTextEntry4] : Sinusitis [FreeTextEntry6] : Pulmonary nodule [FreeTextEntry8] : Diverticulosis [FreeTextEntry5] : Atypical chest pain [de-identified] : Left Achilles tendinitis [de-identified] : Postconcussive syndrome [FreeTextEntry9] : Microscopic hematuria [FreeTextEntry1] : Retroperitoneal adenopathy

## 2024-02-26 NOTE — ASSESSMENT
[FreeTextEntry1] : 52-year-old lady.  Referred for the evaluation and management of retroperitoneal adenopathy noted on CT scan of the abdomen and pelvis performed April 2023 to evaluate microscopic hematuria.  No personal history of malignancy or lymphoproliferative disease.  Physical examination is unremarkable.  For further assessment, and possible tissue sampling, I recommended a PET scan.  Prescription for PET/CT entered.  I have asked her to call me a week after the imaging to discuss the results.  The conversation will guide further management.  Reviewed in detail, all questions answered.  Referring physician contacted through secure email   08/11/23. Summary note. The patient's insurance company declined the initial request for PET scan. Earlier this week I have performed a peer to peer review with one of their physician reviewer's. The patient's insurance will not authorize a PET scan presently. They are suggesting a repeat CAT scan presently, which is a 3-month interval from the prior study, to assess for an interval change, before they are willing to entertain the idea of a PET scan for further imaging. Prescription for repeat CT scan of the abdomen and pelvis entered today   1/25/2024. I returned her call but had to leave a voicemail. Her message indicated that she did not go for the recommended short-term follow-up CT scans and November/December 2023 because she "did not want to at the time". She is ready to have the imaging done now. Prescriptions entered for CT scans of the chest/abdomen/pelvis at [facility] will mail her copies of those prescriptions so she can have them performed. She and I will speak after that.   1/31/2024: The patient had a CT scan of the chest/abdomen/pelvis at our [place] location. This was a follow-up study that had originally been due in April 2023, but she never went for the study.  The current study demonstrates: 1.  No lymphadenopathy, which was the reason for the follow-up study. 2.  A 5 mm RLL nodule stable since October 2013. 3.  A cystic lesion measuring 7 mm in the neck of the pancreas for which abdominal MRI/MRCP are recommended.   02/7/24: I called her to review the above information. I had to leave a voicemail.   2/8/2024. We were able to speak. I reviewed the recommendation indications for the suggested MRI abdomen/MRCP. She understands and agrees. Prescription entered and copy mailed to her.   2/18/2024: MRI abdomen/MRCP at [place]: 1 cm cyst at the junction of the pancreatic neck and body correlating with findings on the recent CT scan. No suspicious features. 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 1 year follow-up recommended to assure stability.................    2/26/2024: I returned her call. Reviewed the above results. She is aware that her annual follow-up with me should be in July 2024.

## 2024-03-11 ENCOUNTER — LABORATORY RESULT (OUTPATIENT)
Age: 54
End: 2024-03-11

## 2024-03-11 ENCOUNTER — APPOINTMENT (OUTPATIENT)
Dept: UROLOGY | Facility: CLINIC | Age: 54
End: 2024-03-11
Payer: COMMERCIAL

## 2024-03-11 VITALS
RESPIRATION RATE: 16 BRPM | DIASTOLIC BLOOD PRESSURE: 75 MMHG | OXYGEN SATURATION: 99 % | TEMPERATURE: 97.4 F | HEART RATE: 71 BPM | SYSTOLIC BLOOD PRESSURE: 110 MMHG

## 2024-03-11 DIAGNOSIS — M76.822 POSTERIOR TIBIAL TENDINITIS, LEFT LEG: ICD-10-CM

## 2024-03-11 DIAGNOSIS — R31.29 OTHER MICROSCOPIC HEMATURIA: ICD-10-CM

## 2024-03-11 PROCEDURE — 99214 OFFICE O/P EST MOD 30 MIN: CPT

## 2024-03-11 PROCEDURE — G2211 COMPLEX E/M VISIT ADD ON: CPT

## 2024-03-12 PROBLEM — M76.822 POSTERIOR TIBIAL TENDON DYSFUNCTION, LEFT: Status: ACTIVE | Noted: 2022-06-15

## 2024-03-12 LAB
APPEARANCE: CLEAR
BILIRUBIN URINE: NEGATIVE
BLOOD URINE: ABNORMAL
COLOR: YELLOW
GLUCOSE QUALITATIVE U: NEGATIVE MG/DL
KETONES URINE: NEGATIVE MG/DL
LEUKOCYTE ESTERASE URINE: ABNORMAL
NITRITE URINE: NEGATIVE
PH URINE: 6.5
PROTEIN URINE: NEGATIVE MG/DL
SPECIFIC GRAVITY URINE: 1.01
UROBILINOGEN URINE: 0.2 MG/DL

## 2024-03-12 NOTE — HISTORY OF PRESENT ILLNESS
[FreeTextEntry1] : 54yo woman w/ hx basal cell carcinoma s/p excision, microhematuria, recurrent UTI's (s/p urethra dilation in her 20s), formerly seen by Dr. Sanders, presents to re-stablish care. Most recent UTI's on 11/23 (EColi, I-amp, cipro) and 1/24 (Pan sensitive E.coli), currently asymptomatic. Was not sure her sxs ever completely resolved after Nov one. Endorses a lot of stress in that time period. Pt microhematuria w/ negative CTU, and cystoscopy 4/2023.  Pt non-smoker but second-hand smoke exposure, drinks 1 glass wine. Last menstrual period october 2023. Typically drinks 6-8 glasses of water per day. No issues with her bowels. Takes flax seed daily.   At baseline, pt reports that she has some prolapse. hx of vaginal delivery x2, first was almost 9 lbs  PMH hypercholesterolemia  FH Mother- colon cancer Father - spine medullary cancer Grandfather- primary bone cancer   PSH tonsillectomy (at 10 y.o)  Meds simvastatin 10mg qd  Allergies NKDA  SH Non-smoker, non-drinker. Real-,

## 2024-03-12 NOTE — ASSESSMENT
[FreeTextEntry1] : Microhematuria negative workup 4/2023.   Recurrent UTI --Urine reflex --Encourage fluid intake. goal 1.5L or more per day --Consider esterase. pt wishes to hold off for now

## 2024-03-12 NOTE — PHYSICAL EXAM
[Normal Appearance] : normal appearance [Well Groomed] : well groomed [Edema] : no peripheral edema [General Appearance - In No Acute Distress] : no acute distress [Respiration, Rhythm And Depth] : normal respiratory rhythm and effort [Exaggerated Use Of Accessory Muscles For Inspiration] : no accessory muscle use [Abdomen Tenderness] : non-tender [Costovertebral Angle Tenderness] : no ~M costovertebral angle tenderness [Abdomen Soft] : soft [Normal Station and Gait] : the gait and station were normal for the patient's age [Urinary Bladder Findings] : the bladder was normal on palpation [] : no rash [No Focal Deficits] : no focal deficits [Oriented To Time, Place, And Person] : oriented to person, place, and time [Affect] : the affect was normal [Mood] : the mood was normal [No Palpable Adenopathy] : no palpable adenopathy

## 2024-04-01 ENCOUNTER — TRANSCRIPTION ENCOUNTER (OUTPATIENT)
Age: 54
End: 2024-04-01

## 2024-04-02 LAB
APPEARANCE: CLEAR
BACTERIA: NEGATIVE /HPF
BILIRUBIN URINE: NEGATIVE
BLOOD URINE: NEGATIVE
CAST: 0 /LPF
COLOR: YELLOW
EPITHELIAL CELLS: 0 /HPF
GLUCOSE QUALITATIVE U: NEGATIVE MG/DL
KETONES URINE: NEGATIVE MG/DL
LEUKOCYTE ESTERASE URINE: NEGATIVE
MICROSCOPIC-UA: NORMAL
NITRITE URINE: NEGATIVE
PH URINE: 7
PROTEIN URINE: NEGATIVE MG/DL
RED BLOOD CELLS URINE: 0 /HPF
SPECIFIC GRAVITY URINE: 1.01
UROBILINOGEN URINE: 0.2 MG/DL
WHITE BLOOD CELLS URINE: 0 /HPF

## 2024-04-04 LAB — BACTERIA UR CULT: NORMAL

## 2024-04-09 ENCOUNTER — APPOINTMENT (OUTPATIENT)
Dept: NEUROLOGY | Facility: CLINIC | Age: 54
End: 2024-04-09
Payer: COMMERCIAL

## 2024-04-09 VITALS
SYSTOLIC BLOOD PRESSURE: 110 MMHG | DIASTOLIC BLOOD PRESSURE: 68 MMHG | HEART RATE: 77 BPM | BODY MASS INDEX: 26.52 KG/M2 | HEIGHT: 66 IN | WEIGHT: 165 LBS

## 2024-04-09 DIAGNOSIS — R06.83 SNORING: ICD-10-CM

## 2024-04-09 DIAGNOSIS — R51.9 HEADACHE, UNSPECIFIED: ICD-10-CM

## 2024-04-09 DIAGNOSIS — F07.81 POSTCONCUSSIONAL SYNDROME: ICD-10-CM

## 2024-04-09 PROCEDURE — 99205 OFFICE O/P NEW HI 60 MIN: CPT

## 2024-04-09 RX ORDER — GABAPENTIN 300 MG/1
300 CAPSULE ORAL TWICE DAILY
Qty: 60 | Refills: 11 | Status: ACTIVE | COMMUNITY
Start: 2024-04-09 | End: 1900-01-01

## 2024-04-09 RX ORDER — NORTRIPTYLINE HYDROCHLORIDE 25 MG/1
25 CAPSULE ORAL
Qty: 30 | Refills: 3 | Status: DISCONTINUED | COMMUNITY
Start: 2024-04-09 | End: 2024-04-09

## 2024-04-09 NOTE — PHYSICAL EXAM
[FreeTextEntry1] : PHYSICAL EXAM Constitutional: Alert, no acute distress  Psychiatric: appropriate affect and mood Pulmonary: No respiratory distress, stable on room air  NEUROLOGICAL EXAM Mental status: The patient is alert, attentive and conversational memory intact. Speech/language: No dysarthria Cranial nerves: CN II: Visual fields are full to confrontation. Pupil size equal and briskly reactive to light.  CN III, IV, VI: EOMI, no nystagmus, no ptosis CN V: Facial sensation is intact to pinprick in all 3 divisions bilaterally. CN VII: Face is symmetric with normal eye closure and smile. CN VII: Hearing is normal to rubbing fingers CN IX, X: Palate elevates symmetrically.  CN XI: Head turning and shoulder shrug are intact CN XII: Tongue is midline with normal movements and no atrophy. Motor: Strength is full bilaterally. 5/5 muscle power in bilateral UE and LE. Reflexes: R        L  Biceps    2+       2+  Patellar   2+       2+  Achilles  2+       2+ Plantar responses- R down, L down Sensory: LT sensation intact UE and LE Coordination/Cerebellar: There is no dysmetria on finger-to-nose and heel to shin.  Gait/Stance: Posture is normal. Gait is steady with normal steps, base, arm swing, and turning. Tandem gait is normal. Romberg is negative.
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
## 2024-04-09 NOTE — ASSESSMENT
[FreeTextEntry1] : 1. Occipital headaches (chronic), post concussive-  Daily headaches, stable. Improved in intensity.  - Will prescribe gabapentin 300 gm qhs x 1 week and then increase to 300 mg BID, as tolerated. Reviewed s/e. - Magnesium 400 mg qhs - OTC pain medications for pain relief as needed Headache education: [] Stay well hydrated [] Limit excessive caffeine and alcohol intake [] Maintain good sleep hygiene. Follow a consistent sleep and wake schedule.  [] Practice good eating habits. Avoid skipping meals.  [] Try to avoid any known triggers [] Avoid excessive use of over the counter pain medications, as they can cause medication overuse headaches  [] Keep a headache diary [] Relaxation techniques, biofeedback, massage therapy, acupunctures, and heating pads may be effective   2. Snoring- Will get HST to rule out DOUGLAS.  Iep apnea. Discussed the importance of sleep apnea evaluation and treatment, if needed. If left untreated, obstructive sleep apnea can increase the risk of health problems, including: High blood pressure, Stroke, Heart failure, and heart attacks. Untreated sleep apnea can also exacerbate headaches.    RTC 4 months

## 2024-04-09 NOTE — DATA REVIEWED
[de-identified] : MRI brain 9/23/2021 (Dr Marsh office)- report reviewed- no intraparenchymal abnormalities. Incidental intraventricular subarachnoid cyst.

## 2024-04-25 ENCOUNTER — RX RENEWAL (OUTPATIENT)
Age: 54
End: 2024-04-25

## 2024-04-25 RX ORDER — SIMVASTATIN 10 MG/1
10 TABLET, FILM COATED ORAL
Qty: 90 | Refills: 0 | Status: ACTIVE | COMMUNITY
Start: 1900-01-01 | End: 1900-01-01

## 2024-05-08 ENCOUNTER — APPOINTMENT (OUTPATIENT)
Dept: MAMMOGRAPHY | Facility: CLINIC | Age: 54
End: 2024-05-08
Payer: COMMERCIAL

## 2024-05-08 ENCOUNTER — OUTPATIENT (OUTPATIENT)
Dept: OUTPATIENT SERVICES | Facility: HOSPITAL | Age: 54
LOS: 1 days | End: 2024-05-08
Payer: COMMERCIAL

## 2024-05-08 DIAGNOSIS — Z00.8 ENCOUNTER FOR OTHER GENERAL EXAMINATION: ICD-10-CM

## 2024-05-08 DIAGNOSIS — Z00.00 ENCOUNTER FOR GENERAL ADULT MEDICAL EXAMINATION WITHOUT ABNORMAL FINDINGS: ICD-10-CM

## 2024-05-08 PROCEDURE — 77067 SCR MAMMO BI INCL CAD: CPT

## 2024-05-08 PROCEDURE — 77063 BREAST TOMOSYNTHESIS BI: CPT | Mod: 26

## 2024-05-08 PROCEDURE — 77067 SCR MAMMO BI INCL CAD: CPT | Mod: 26

## 2024-05-08 PROCEDURE — 77063 BREAST TOMOSYNTHESIS BI: CPT

## 2024-05-14 ENCOUNTER — APPOINTMENT (OUTPATIENT)
Dept: UROLOGY | Facility: CLINIC | Age: 54
End: 2024-05-14
Payer: COMMERCIAL

## 2024-05-14 VITALS
BODY MASS INDEX: 26.52 KG/M2 | OXYGEN SATURATION: 97 % | SYSTOLIC BLOOD PRESSURE: 124 MMHG | HEIGHT: 66 IN | WEIGHT: 165 LBS | HEART RATE: 118 BPM | RESPIRATION RATE: 17 BRPM | DIASTOLIC BLOOD PRESSURE: 64 MMHG

## 2024-05-14 DIAGNOSIS — N39.0 URINARY TRACT INFECTION, SITE NOT SPECIFIED: ICD-10-CM

## 2024-05-14 DIAGNOSIS — N81.10 CYSTOCELE, UNSPECIFIED: ICD-10-CM

## 2024-05-14 PROCEDURE — 51798 US URINE CAPACITY MEASURE: CPT

## 2024-05-14 PROCEDURE — 99215 OFFICE O/P EST HI 40 MIN: CPT | Mod: 25

## 2024-05-14 RX ORDER — ESTRADIOL 0.1 MG/G
0.1 CREAM VAGINAL
Qty: 1 | Refills: 3 | Status: ACTIVE | COMMUNITY
Start: 2024-05-14 | End: 1900-01-01

## 2024-05-14 NOTE — REASON FOR VISIT
[TextEntry] : 53-year-old female who presents for pelvic organ prolapse and recurrent UTI  Patient was previously following with Dr. Montoya for recurrent UTIs.  She was last seen in 3/2024.  She was encouraged to increase fluid intake. Patient reports that her UTI symptoms include urgency and dysuria.  When she takes antibiotics this helps her symptoms.  Recently she has had cystitis with hematuria.  She has a history of UTIs in her 20s.  These were mostly postcoital and she underwent a urethral dilation which she feels fix her problem.  At baseline she denies any urinary issues.  She denies any leakage or urgency urge incontinence.  She denies any stress urinary incontinence.  She denies straining to empty. She has UTI symptoms today and went to urgent care. She is on abx now.   Patient with a history of microhematuria status post negative workup in 2023.   She is a  via vaginal delivery.  She had her kids at age 44 and 46 years of age.  She is perimenopausal.  She has a symptomatic vaginal bulge.  She has noticed it over the past year and was seen by her gynecologist.  She opted for pelvic floor exercises but it has progressed.  She denies bowel issues She has hyperlipidemia and anxiety.  She is following up with cardiology tomorrow and she is unsure why.  She had a CT in 2024 with Dr. Allen which was remarkable only for a left renal cyst otherwise no stones in her kidneys, hydronephrosis, or abnormalities in the bladder.   cc--> 5 cc after pessary placement prior to dislodgement  UA 24- leukocytes 500, blood 200; UCx pending  UCx 24 e.coli  UCx 2024 negative Ucx  E.coli UCx  E.coli

## 2024-05-14 NOTE — PHYSICAL EXAM
[Normal Appearance] : normal appearance [Well Groomed] : well groomed [General Appearance - In No Acute Distress] : no acute distress [] : no respiratory distress [Abdomen Soft] : soft [Abdomen Tenderness] : non-tender [Urethral Meatus] : the meatus of the urethra showed no abnormalities [Normal Station and Gait] : the gait and station were normal for the patient's age [No Focal Deficits] : no focal deficits [Oriented To Time, Place, And Person] : oriented to person, place, and time [Affect] : the affect was normal [Mood] : the mood was normal [de-identified] : neg CST, nontender pelvic floor muscle, Aa Ba +2 TVL 9.5 C -5 Ap Bp -2; fitted with 3.5# biotyque ring with support which dislodged. attempted 3.75# but was too uncomfortable

## 2024-05-14 NOTE — ASSESSMENT
[FreeTextEntry1] : 53-year-old female who presents for:  1.  Recurrent UTI-patient has had many UTIs over the past couple of months.  She most recently had blood in her urine.  Her urine culture is currently pending.  Advised her to send it when it is available-if it is negative, she will need a gross hematuria workup.  We do long discussion about UTI prevention.  Moving forward she will double void and reduce her prolapse to better empty, ensure she is drinking 1.5 L of water per day, start on vaginal estrogen cream 3 nights per week, start cranberry tablets daily, and begin taking a probiotic.  If she continues to have breakthrough infections, we will add methenamine and consider cystoscopy.  2.  Anterior prolapse-patient had elevated residual prior to pessary placement with improved emptying after.  This may be contributing to her infections in combination with other factors.  The bulge regardless is bothersome to her.  She is interested in surgical repair.  We unfortunately were unable to find a pessary that was comfortable and stayed in position today.  We will proceed with anterior colporrhaphy possible perineal body repair. Explained that this is a same-day procedure that will take about 3 hours at the ambulatory surgery center.  She will need medical clearance prior.  Explained that she will require a 6-week recovery period with nothing per vagina and no strenuous activity.  We also discussed the risk of recurrence given that she is young and active.  If she does recur we would discuss other strategies.  She wants to proceed.  To OR for anterior colporrhaphy, med clearance prior Return to clinic in 2 to 3 months if no OR prior

## 2024-05-15 ENCOUNTER — APPOINTMENT (OUTPATIENT)
Dept: CARDIOLOGY | Facility: CLINIC | Age: 54
End: 2024-05-15
Payer: COMMERCIAL

## 2024-05-15 DIAGNOSIS — R94.39 ABNORMAL RESULT OF OTHER CARDIOVASCULAR FUNCTION STUDY: ICD-10-CM

## 2024-05-15 LAB
APPEARANCE: CLEAR
BACTERIA: NEGATIVE /HPF
BILIRUBIN URINE: NEGATIVE
BLOOD URINE: NEGATIVE
CAST: 0 /LPF
COLOR: YELLOW
EPITHELIAL CELLS: 0 /HPF
GLUCOSE QUALITATIVE U: NEGATIVE MG/DL
KETONES URINE: NEGATIVE MG/DL
LEUKOCYTE ESTERASE URINE: NEGATIVE
MICROSCOPIC-UA: NORMAL
NITRITE URINE: NEGATIVE
PH URINE: 7.5
PROTEIN URINE: NEGATIVE MG/DL
RED BLOOD CELLS URINE: 0 /HPF
SPECIFIC GRAVITY URINE: 1
UROBILINOGEN URINE: 0.2 MG/DL
WHITE BLOOD CELLS URINE: 0 /HPF

## 2024-05-15 PROCEDURE — 93015 CV STRESS TEST SUPVJ I&R: CPT

## 2024-05-15 PROCEDURE — 93306 TTE W/DOPPLER COMPLETE: CPT

## 2024-06-04 ENCOUNTER — APPOINTMENT (OUTPATIENT)
Dept: CARDIOLOGY | Facility: CLINIC | Age: 54
End: 2024-06-04
Payer: COMMERCIAL

## 2024-06-04 PROCEDURE — 93351 STRESS TTE COMPLETE: CPT

## 2024-06-24 NOTE — PHYSICAL EXAM
History/physical reviewed and verified.  No changes to report.    [Well Developed] : well developed [Well Nourished] : well nourished [No Acute Distress] : no acute distress [Normal Conjunctiva] : normal conjunctiva [Normal Venous Pressure] : normal venous pressure [No Carotid Bruit] : no carotid bruit [Normal S1, S2] : normal S1, S2 [No Rub] : no rub [No Gallop] : no gallop [Murmur] : murmur [Clear Lung Fields] : clear lung fields [Good Air Entry] : good air entry [No Respiratory Distress] : no respiratory distress  [Soft] : abdomen soft [Non Tender] : non-tender [No Masses/organomegaly] : no masses/organomegaly [Normal Bowel Sounds] : normal bowel sounds [Normal Gait] : normal gait [No Edema] : no edema [No Cyanosis] : no cyanosis [No Clubbing] : no clubbing [No Varicosities] : no varicosities [No Rash] : no rash [No Skin Lesions] : no skin lesions [Moves all extremities] : moves all extremities [No Focal Deficits] : no focal deficits [Normal Speech] : normal speech [Alert and Oriented] : alert and oriented [Normal memory] : normal memory [de-identified] : I/VI ALISA base

## 2024-07-11 ENCOUNTER — OUTPATIENT (OUTPATIENT)
Dept: OUTPATIENT SERVICES | Facility: HOSPITAL | Age: 54
LOS: 1 days | End: 2024-07-11
Payer: COMMERCIAL

## 2024-07-11 VITALS
OXYGEN SATURATION: 98 % | WEIGHT: 160.94 LBS | HEIGHT: 66 IN | HEART RATE: 92 BPM | RESPIRATION RATE: 16 BRPM | TEMPERATURE: 98 F | SYSTOLIC BLOOD PRESSURE: 128 MMHG | DIASTOLIC BLOOD PRESSURE: 76 MMHG

## 2024-07-11 DIAGNOSIS — Z98.890 OTHER SPECIFIED POSTPROCEDURAL STATES: Chronic | ICD-10-CM

## 2024-07-11 DIAGNOSIS — Z01.818 ENCOUNTER FOR OTHER PREPROCEDURAL EXAMINATION: ICD-10-CM

## 2024-07-11 DIAGNOSIS — N81.10 CYSTOCELE, UNSPECIFIED: ICD-10-CM

## 2024-07-11 LAB
ANION GAP SERPL CALC-SCNC: 11 MMOL/L — SIGNIFICANT CHANGE UP (ref 5–17)
BLD GP AB SCN SERPL QL: NEGATIVE — SIGNIFICANT CHANGE UP
BUN SERPL-MCNC: 17 MG/DL — SIGNIFICANT CHANGE UP (ref 7–23)
CALCIUM SERPL-MCNC: 9.8 MG/DL — SIGNIFICANT CHANGE UP (ref 8.4–10.5)
CHLORIDE SERPL-SCNC: 101 MMOL/L — SIGNIFICANT CHANGE UP (ref 96–108)
CO2 SERPL-SCNC: 24 MMOL/L — SIGNIFICANT CHANGE UP (ref 22–31)
CREAT SERPL-MCNC: 0.53 MG/DL — SIGNIFICANT CHANGE UP (ref 0.5–1.3)
EGFR: 112 ML/MIN/1.73M2 — SIGNIFICANT CHANGE UP
GLUCOSE SERPL-MCNC: 95 MG/DL — SIGNIFICANT CHANGE UP (ref 70–99)
HCT VFR BLD CALC: 36.5 % — SIGNIFICANT CHANGE UP (ref 34.5–45)
HCV AB S/CO SERPL IA: 0.06 S/CO — SIGNIFICANT CHANGE UP
HCV AB SERPL-IMP: SIGNIFICANT CHANGE UP
HGB BLD-MCNC: 11.8 G/DL — SIGNIFICANT CHANGE UP (ref 11.5–15.5)
MCHC RBC-ENTMCNC: 30.1 PG — SIGNIFICANT CHANGE UP (ref 27–34)
MCHC RBC-ENTMCNC: 32.3 GM/DL — SIGNIFICANT CHANGE UP (ref 32–36)
MCV RBC AUTO: 93.1 FL — SIGNIFICANT CHANGE UP (ref 80–100)
NRBC # BLD: 0 /100 WBCS — SIGNIFICANT CHANGE UP (ref 0–0)
PLATELET # BLD AUTO: 196 K/UL — SIGNIFICANT CHANGE UP (ref 150–400)
POTASSIUM SERPL-MCNC: 4.5 MMOL/L — SIGNIFICANT CHANGE UP (ref 3.5–5.3)
POTASSIUM SERPL-SCNC: 4.5 MMOL/L — SIGNIFICANT CHANGE UP (ref 3.5–5.3)
RBC # BLD: 3.92 M/UL — SIGNIFICANT CHANGE UP (ref 3.8–5.2)
RBC # FLD: 14.1 % — SIGNIFICANT CHANGE UP (ref 10.3–14.5)
RH IG SCN BLD-IMP: POSITIVE — SIGNIFICANT CHANGE UP
SODIUM SERPL-SCNC: 136 MMOL/L — SIGNIFICANT CHANGE UP (ref 135–145)
WBC # BLD: 5.39 K/UL — SIGNIFICANT CHANGE UP (ref 3.8–10.5)
WBC # FLD AUTO: 5.39 K/UL — SIGNIFICANT CHANGE UP (ref 3.8–10.5)

## 2024-07-11 PROCEDURE — 86850 RBC ANTIBODY SCREEN: CPT

## 2024-07-11 PROCEDURE — 86901 BLOOD TYPING SEROLOGIC RH(D): CPT

## 2024-07-11 PROCEDURE — 86803 HEPATITIS C AB TEST: CPT

## 2024-07-11 PROCEDURE — 87186 SC STD MICRODIL/AGAR DIL: CPT

## 2024-07-11 PROCEDURE — 80048 BASIC METABOLIC PNL TOTAL CA: CPT

## 2024-07-11 PROCEDURE — G0463: CPT

## 2024-07-11 PROCEDURE — 87086 URINE CULTURE/COLONY COUNT: CPT

## 2024-07-11 PROCEDURE — 85027 COMPLETE CBC AUTOMATED: CPT

## 2024-07-11 PROCEDURE — 86900 BLOOD TYPING SEROLOGIC ABO: CPT

## 2024-07-11 RX ORDER — GABAPENTIN
0 POWDER (GRAM) MISCELLANEOUS
Refills: 0 | DISCHARGE

## 2024-07-11 RX ORDER — SIMVASTATIN 40 MG
1 TABLET ORAL
Refills: 0 | DISCHARGE

## 2024-07-11 NOTE — H&P PST ADULT - PROBLEM SELECTOR PLAN 1
Pt. scheduled for Pt. scheduled for Anterior Colporrhaphy, Possible Perineal Body Repair with Dr. Oshea on 8/1/24.   Pre-op instructions given, all questions answered.  Surgical Soap given.  Labs: CBC, BMP, T&S Pt. scheduled for Anterior Colporrhaphy, Possible Perineal Body Repair with Dr. Oshea on 8/1/24.   Pre-op instructions given, all questions answered.  Labs: CBC, BMP, T&S, UC, Hep C

## 2024-07-11 NOTE — H&P PST ADULT - NSICDXPASTMEDICALHX_GEN_ALL_CORE_FT
PAST MEDICAL HISTORY:  History of concussion     HLD (hyperlipidemia)      PAST MEDICAL HISTORY:  Female cystocele     History of concussion     HLD (hyperlipidemia)
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
## 2024-07-11 NOTE — H&P PST ADULT - PSYCHIATRIC COMMENTS
pt report some anxiety and depression denies wanting to harm self or other. Behavioral Health counseling number provided to pt for additional resources

## 2024-07-11 NOTE — H&P PST ADULT - NSICDXPASTSURGICALHX_GEN_ALL_CORE_FT
PAST SURGICAL HISTORY:  H/O colonoscopy     History of tonsillectomy      PAST SURGICAL HISTORY:  H/O colonoscopy     History of radial keratotomy     History of tonsillectomy

## 2024-07-11 NOTE — H&P PST ADULT - ASSESSMENT
DASI Score:  DASI Activity: able to go up one flight of stairs or walk 1-2 blocks with out difficulty  Loose or removable teeth: denies  DASI Score:7.44  DASI Activity: pt drives, performs all ADL's without assistance, able to go up one flight of stairs or walk 1-2 blocks without difficulty  Loose or removable teeth: denies
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
## 2024-07-11 NOTE — H&P PST ADULT - OTHER CARE PROVIDERS
Dr. NAIMA Shirley [phone]  (neurologist) last seen: April 9, 2024  Dr. BARRY Estevez [phone]  (cardiologist) last seen: June 4, 2024

## 2024-07-15 LAB
-  AMOXICILLIN/CLAVULANIC ACID: SIGNIFICANT CHANGE UP
-  AMPICILLIN/SULBACTAM: SIGNIFICANT CHANGE UP
-  AMPICILLIN: SIGNIFICANT CHANGE UP
-  AZTREONAM: SIGNIFICANT CHANGE UP
-  CEFAZOLIN: SIGNIFICANT CHANGE UP
-  CEFEPIME: SIGNIFICANT CHANGE UP
-  CEFOXITIN: SIGNIFICANT CHANGE UP
-  CEFTRIAXONE: SIGNIFICANT CHANGE UP
-  CEFUROXIME: SIGNIFICANT CHANGE UP
-  CIPROFLOXACIN: SIGNIFICANT CHANGE UP
-  ERTAPENEM: SIGNIFICANT CHANGE UP
-  GENTAMICIN: SIGNIFICANT CHANGE UP
-  IMIPENEM: SIGNIFICANT CHANGE UP
-  LEVOFLOXACIN: SIGNIFICANT CHANGE UP
-  MEROPENEM: SIGNIFICANT CHANGE UP
-  NITROFURANTOIN: SIGNIFICANT CHANGE UP
-  PIPERACILLIN/TAZOBACTAM: SIGNIFICANT CHANGE UP
-  TOBRAMYCIN: SIGNIFICANT CHANGE UP
-  TRIMETHOPRIM/SULFAMETHOXAZOLE: SIGNIFICANT CHANGE UP
CULTURE RESULTS: ABNORMAL
METHOD TYPE: SIGNIFICANT CHANGE UP
ORGANISM # SPEC MICROSCOPIC CNT: ABNORMAL
ORGANISM # SPEC MICROSCOPIC CNT: ABNORMAL
SPECIMEN SOURCE: SIGNIFICANT CHANGE UP

## 2024-07-17 ENCOUNTER — NON-APPOINTMENT (OUTPATIENT)
Age: 54
End: 2024-07-17

## 2024-07-17 ENCOUNTER — APPOINTMENT (OUTPATIENT)
Dept: CARDIOLOGY | Facility: CLINIC | Age: 54
End: 2024-07-17
Payer: COMMERCIAL

## 2024-07-17 VITALS
BODY MASS INDEX: 26.03 KG/M2 | SYSTOLIC BLOOD PRESSURE: 118 MMHG | OXYGEN SATURATION: 98 % | HEIGHT: 66 IN | WEIGHT: 162 LBS | DIASTOLIC BLOOD PRESSURE: 80 MMHG | HEART RATE: 79 BPM

## 2024-07-17 DIAGNOSIS — R94.39 ABNORMAL RESULT OF OTHER CARDIOVASCULAR FUNCTION STUDY: ICD-10-CM

## 2024-07-17 DIAGNOSIS — R07.89 OTHER CHEST PAIN: ICD-10-CM

## 2024-07-17 DIAGNOSIS — Z87.898 PERSONAL HISTORY OF OTHER SPECIFIED CONDITIONS: ICD-10-CM

## 2024-07-17 DIAGNOSIS — Z01.810 ENCOUNTER FOR PREPROCEDURAL CARDIOVASCULAR EXAMINATION: ICD-10-CM

## 2024-07-17 DIAGNOSIS — N81.10 CYSTOCELE, UNSPECIFIED: ICD-10-CM

## 2024-07-17 PROCEDURE — 99214 OFFICE O/P EST MOD 30 MIN: CPT

## 2024-07-17 PROCEDURE — 93000 ELECTROCARDIOGRAM COMPLETE: CPT | Mod: NC

## 2024-07-19 ENCOUNTER — APPOINTMENT (OUTPATIENT)
Dept: INTERNAL MEDICINE | Facility: CLINIC | Age: 54
End: 2024-07-19
Payer: COMMERCIAL

## 2024-07-19 VITALS
SYSTOLIC BLOOD PRESSURE: 106 MMHG | BODY MASS INDEX: 26.03 KG/M2 | OXYGEN SATURATION: 97 % | DIASTOLIC BLOOD PRESSURE: 69 MMHG | RESPIRATION RATE: 17 BRPM | HEART RATE: 80 BPM | WEIGHT: 162 LBS | HEIGHT: 66 IN | TEMPERATURE: 97.8 F

## 2024-07-19 DIAGNOSIS — Z01.818 ENCOUNTER FOR OTHER PREPROCEDURAL EXAMINATION: ICD-10-CM

## 2024-07-19 PROCEDURE — 99214 OFFICE O/P EST MOD 30 MIN: CPT

## 2024-07-19 PROCEDURE — G2211 COMPLEX E/M VISIT ADD ON: CPT

## 2024-07-21 PROBLEM — Z01.818 PREOPERATIVE EXAMINATION: Status: ACTIVE | Noted: 2024-07-21

## 2024-07-31 ENCOUNTER — RX RENEWAL (OUTPATIENT)
Age: 54
End: 2024-07-31

## 2024-08-02 ENCOUNTER — APPOINTMENT (OUTPATIENT)
Dept: PULMONOLOGY | Facility: CLINIC | Age: 54
End: 2024-08-02

## 2024-08-02 ENCOUNTER — APPOINTMENT (OUTPATIENT)
Dept: PULMONOLOGY | Facility: CLINIC | Age: 54
End: 2024-08-02
Payer: COMMERCIAL

## 2024-08-02 VITALS
OXYGEN SATURATION: 95 % | DIASTOLIC BLOOD PRESSURE: 75 MMHG | TEMPERATURE: 98.1 F | BODY MASS INDEX: 25.55 KG/M2 | SYSTOLIC BLOOD PRESSURE: 114 MMHG | HEART RATE: 100 BPM | HEIGHT: 66 IN | WEIGHT: 159 LBS

## 2024-08-02 DIAGNOSIS — J01.90 ACUTE SINUSITIS, UNSPECIFIED: ICD-10-CM

## 2024-08-02 DIAGNOSIS — R09.89 OTHER SPECIFIED SYMPTOMS AND SIGNS INVOLVING THE CIRCULATORY AND RESPIRATORY SYSTEMS: ICD-10-CM

## 2024-08-02 DIAGNOSIS — J20.9 ACUTE BRONCHITIS, UNSPECIFIED: ICD-10-CM

## 2024-08-02 DIAGNOSIS — J68.3 OTHER ACUTE AND SUBACUTE RESPIRATORY CONDITIONS DUE TO CHEMICALS, GASES, FUMES AND VAPORS: ICD-10-CM

## 2024-08-02 PROCEDURE — 99215 OFFICE O/P EST HI 40 MIN: CPT

## 2024-08-02 RX ORDER — FLUTICASONE FUROATE AND VILANTEROL TRIFENATATE 100; 25 UG/1; UG/1
100-25 POWDER RESPIRATORY (INHALATION)
Qty: 1 | Refills: 3 | Status: ACTIVE | COMMUNITY
Start: 2024-08-02 | End: 1900-01-01

## 2024-08-02 RX ORDER — DOXYCYCLINE HYCLATE 100 MG/1
100 CAPSULE ORAL
Qty: 14 | Refills: 0 | Status: ACTIVE | COMMUNITY
Start: 2024-08-02 | End: 1900-01-01

## 2024-08-02 RX ORDER — ALBUTEROL SULFATE 90 UG/1
108 (90 BASE) INHALANT RESPIRATORY (INHALATION)
Qty: 1 | Refills: 4 | Status: ACTIVE | COMMUNITY
Start: 2024-08-02 | End: 1900-01-01

## 2024-08-02 RX ORDER — PREDNISONE 10 MG/1
10 TABLET ORAL
Qty: 20 | Refills: 1 | Status: ACTIVE | COMMUNITY
Start: 2024-08-02 | End: 1900-01-01

## 2024-08-02 NOTE — DISCUSSION/SUMMARY
[FreeTextEntry1] : 53 year old woman who has a chronic 5 mm RLL nodule stable at least from 2013.  This does not require follow up Last seen 06/2023  She did have evaluation for  intraabdominal enlarged lymph nodes, this workup was negative  she has pectus deformity that does not require intervention  In 06/2024 she developed URI symptoms with fever.  She was evaluate in urgent care center viral studies negative. She has not been on an antibiotics .  She was subsequently given prednisone 50 mg for 5 days which she just completed. She had yellow/green mucous that was copious  She is having some sinus symptoms.  CXR at  was  unremarkable by report  Taking Mucinex D and Flonase.   She now wheeze and rhonchi bilaterally, despite oral prednisone  I reviewed  CXR images that show increased markings on PA images, no clear infiltrate.  Pneumonia not ruled out to my review  Will treat for sinusitis and possible pulmonary infection  PLAN  treat as asthma exacerbation with possible sinus infection  rule out pneumonia  PLAN  Oral antibiotics doxycycline  100 mg bid for 7 days  steroid taper   prednisone 30 mg for 2 days, 20 mg for 2 days 10 mg for 2 days  extra strength saline nasal at night, Flonase at night  start on ICS LAMA LABA  provided sample  may use albuterol MDI as needed   Should postpone gyn surgery  Total time spent : 40 minutes Including: Preparation prior to visit - Reviewing prior record, results of tests and Consultation Reports as applicable Conducting an appropriate H & P during today's encounter  reviewing all available CT chest exams.  demonstrating images to pt as appropriate Counseling patient  Note completion  med renewal discussing differential diagnosis   Neo Mayers MD

## 2024-08-02 NOTE — PHYSICAL EXAM
[No Acute Distress] : no acute distress [Well Nourished] : well nourished [Well Developed] : well developed [Normal Oropharynx] : normal oropharynx [I] : Mallampati Class: I [Normal Appearance] : normal appearance [Normal Rate/Rhythm] : normal rate/rhythm [Normal S1, S2] : normal s1, s2 [No Murmurs] : no murmurs [No Resp Distress] : no resp distress [Clear to Auscultation Bilaterally] : clear to auscultation bilaterally [Benign] : benign [Not Tender] : not tender [No Masses] : no masses [No Clubbing] : no clubbing [No Edema] : no edema [No Focal Deficits] : no focal deficits [Oriented x3] : oriented x3 [TextBox_80] : mild pectus

## 2024-08-02 NOTE — HISTORY OF PRESENT ILLNESS
[Former] : former [Never] : never [TextBox_4] : 53 year old patient presents for evaluation of LLL nodules  She has  history of left sided back pain. She also has diverticulosis and had CT abdomen that demonstrated a RLL 5 mm nodule.  CT chest confirmed this but nodule was also found to be preexisting and stable from 2013, seen on a CT abdomen performed at the time   CT chest was remarkable for mild pectus deformity with diminished AP diameter.  there was no lymphadenopathy, interstitial lung disease or other suspicious nodules    She is having work up for intraabdominal enlarged retroperitoneal lymph nodes and diverticulosis  She has had sinus problems treated with Flonase okay great thank you for possible       Primary doctor is Dr Sutton   PSH: tonsils    PMH:  diverticulosis  HLD  sinusitis used Flonase, prolonged course     SH:  former smoker, social   ETOH:  occasional   Occupation:   No exposure to chemicals, dust, asbestos, mold     ALLERGY:  NKDA   environmental/seasonal allergy: possible    Review of Systems:  No rash, skin problems No dry eyes no mouth ulcers no dysphagia no dry mouth  no asthma   no pneumonia no wheeze no lung cancer  no CAD no MI no chest pain no murmur no CHF no HTN no edema  no peptic ulcer or gastritis no GERD no abdominal pain no liver disease  no Diabetes no thyroid disease   no bleeding  no DVT or PE  no kidney disease  no stroke no seizure              [YearQuit] : 40+ [TextBox_22] : very little in past, social [Snoring] : no snoring [Unintentional Sleep while Active] : no unintentional sleep while active [Witnessed Apneas] : no witnessed apneas

## 2024-08-14 ENCOUNTER — APPOINTMENT (OUTPATIENT)
Dept: UROLOGY | Facility: CLINIC | Age: 54
End: 2024-08-14
Payer: COMMERCIAL

## 2024-08-14 VITALS
TEMPERATURE: 98 F | OXYGEN SATURATION: 99 % | HEIGHT: 66 IN | BODY MASS INDEX: 25.55 KG/M2 | HEART RATE: 90 BPM | RESPIRATION RATE: 16 BRPM | DIASTOLIC BLOOD PRESSURE: 70 MMHG | WEIGHT: 159 LBS | SYSTOLIC BLOOD PRESSURE: 111 MMHG

## 2024-08-14 DIAGNOSIS — N81.10 CYSTOCELE, UNSPECIFIED: ICD-10-CM

## 2024-08-14 DIAGNOSIS — N39.0 URINARY TRACT INFECTION, SITE NOT SPECIFIED: ICD-10-CM

## 2024-08-14 PROCEDURE — A4562: CPT

## 2024-08-14 PROCEDURE — 57160 INSERT PESSARY/OTHER DEVICE: CPT

## 2024-08-14 PROCEDURE — 99204 OFFICE O/P NEW MOD 45 MIN: CPT | Mod: 25

## 2024-08-14 RX ORDER — METHENAMINE HIPPURATE 1 G/1
1 TABLET ORAL
Qty: 60 | Refills: 11 | Status: ACTIVE | COMMUNITY
Start: 2024-08-14 | End: 1900-01-01

## 2024-08-14 NOTE — ASSESSMENT
[FreeTextEntry1] : 53-year-old female presents for:  1.  Recurrent UTI-again reiterated UTI prevention including water intake, cranberry, estrogen cream, probiotics.  We will also start her on methenamine twice daily with vitamin C.  2.  Pelvic organ prolapse-again reattempted a pessary trial.  She is waiting for her pulmonary symptoms to resolve however she has been manually reducing her prolapse in the interim to empty better.  She is still interested in prolapse repair when she is cleared medically.   Again discussed that this would be an outpatient surgery, take 1.5 to 2 hours.  We perform anterior colporrhaphy with cystoscopy and native site repair without mesh.  She would require a 6-week recovery period. She will call when she is ready for OR.  Will call patient with results from Urine studies today

## 2024-08-14 NOTE — REASON FOR VISIT
[TextEntry] : 53-year-old female who presents for pelvic organ prolapse and recurrent UTI  Patient was previously following with Dr. Montoya for recurrent UTIs.  She was last seen in 3/2024.  She was encouraged to increase fluid intake. Patient reports that her UTI symptoms include urgency and dysuria.  When she takes antibiotics this helps her symptoms.  Recently she has had cystitis with hematuria.  She has a history of UTIs in her 20s.  These were mostly postcoital and she underwent a urethral dilation which she feels fix her problem.  At baseline she denies any urinary issues.  She denies any leakage or urgency urge incontinence.  She denies any stress urinary incontinence.  She denies straining to empty. She has UTI symptoms today and went to urgent care. She is on abx now.   Patient with a history of microhematuria status post negative workup in 2023.   She is a  via vaginal delivery.  She had her kids at age 44 and 46 years of age.  She is perimenopausal.  She has a symptomatic vaginal bulge.  She has noticed it over the past year and was seen by her gynecologist.  She opted for pelvic floor exercises but it has progressed.  She denies bowel issues She has hyperlipidemia and anxiety.  She is following up with cardiology tomorrow and she is unsure why.  She had a CT in 2024 with Dr. Allen which was remarkable only for a left renal cyst otherwise no stones in her kidneys, hydronephrosis, or abnormalities in the bladder.   cc--> 5 cc after pessary placement prior to dislodgement  UA 24- leukocytes 500, blood 200; UCx pending  UCx 24 e.coli  UCx 2024 negative Ucx  E.coli UCx  E.coli Genitourinary: the meatus of the urethra showed no abnormalities . neg CST, nontender pelvic floor muscle, Aa Ba +2 TVL 9.5 C -5 Ap Bp -2; fitted with 3.5# biotyque ring with support which dislodged. attempted 3.75# but was too uncomfortable.   Patient counseled about UTI prevention.  We also discussed her prolapse and incomplete bladder emptying.  She unfortunately failed a pessary so was counseled about surgical repair.  Patient was scheduled for surgery but this was postponed pending clearances. Urine culture 10-40 9K E. coli on   Today on  patient reports that she is eager for the surgery.  She is having slight dysuria today.  She has been reducing her prolapse manually to have better empty.  She is planning to start cranberry as well as estrogen cream, she has been working on her water intake. She is still dealing with the sinus pressure but she completed her course of antibiotics and steroids.   CT abdomen pelvis done 2024 showed no hydronephrosis or stones in the kidneys, left subcentimeter foci that is stable, normal-appearing bladder, calcified fibroid.

## 2024-08-14 NOTE — PHYSICAL EXAM
[Normal Appearance] : normal appearance [Well Groomed] : well groomed [General Appearance - In No Acute Distress] : no acute distress [Normal Station and Gait] : the gait and station were normal for the patient's age [] : no rash [No Focal Deficits] : no focal deficits [Affect] : the affect was normal [Mood] : the mood was normal [de-identified] : +neg CST, Aa Ba +0.5, Ab Bp -1 TVL 9 C -6- minimal valsalva effort today. Attempted to place 3", 3.5", and 3.75" (too uncomfortable again) ring with support

## 2024-08-15 LAB
APPEARANCE: CLEAR
BACTERIA: NEGATIVE /HPF
BILIRUBIN URINE: NEGATIVE
BLOOD URINE: NEGATIVE
CAST: 0 /LPF
COLOR: YELLOW
EPITHELIAL CELLS: 1 /HPF
GLUCOSE QUALITATIVE U: NEGATIVE MG/DL
KETONES URINE: NEGATIVE MG/DL
LEUKOCYTE ESTERASE URINE: ABNORMAL
MICROSCOPIC-UA: NORMAL
NITRITE URINE: NEGATIVE
PH URINE: 7
PROTEIN URINE: NEGATIVE MG/DL
RED BLOOD CELLS URINE: 1 /HPF
SPECIFIC GRAVITY URINE: 1.01
UROBILINOGEN URINE: 0.2 MG/DL
WHITE BLOOD CELLS URINE: 0 /HPF

## 2024-08-16 LAB — BACTERIA UR CULT: NORMAL

## 2024-08-23 ENCOUNTER — APPOINTMENT (OUTPATIENT)
Dept: PULMONOLOGY | Facility: CLINIC | Age: 54
End: 2024-08-23
Payer: COMMERCIAL

## 2024-08-23 VITALS
RESPIRATION RATE: 16 BRPM | OXYGEN SATURATION: 97 % | BODY MASS INDEX: 25.55 KG/M2 | SYSTOLIC BLOOD PRESSURE: 96 MMHG | HEART RATE: 72 BPM | DIASTOLIC BLOOD PRESSURE: 64 MMHG | TEMPERATURE: 98.4 F | WEIGHT: 159 LBS | HEIGHT: 66 IN

## 2024-08-23 DIAGNOSIS — J01.90 ACUTE SINUSITIS, UNSPECIFIED: ICD-10-CM

## 2024-08-23 DIAGNOSIS — R09.89 OTHER SPECIFIED SYMPTOMS AND SIGNS INVOLVING THE CIRCULATORY AND RESPIRATORY SYSTEMS: ICD-10-CM

## 2024-08-23 DIAGNOSIS — J34.2 DEVIATED NASAL SEPTUM: ICD-10-CM

## 2024-08-23 DIAGNOSIS — J20.9 ACUTE BRONCHITIS, UNSPECIFIED: ICD-10-CM

## 2024-08-23 PROCEDURE — 99214 OFFICE O/P EST MOD 30 MIN: CPT

## 2024-08-23 NOTE — DISCUSSION/SUMMARY
[FreeTextEntry1] : 53 year old woman who has a chronic 5 mm RLL nodule stable at least from 2013.  This does not require follow up Last seen 06/2023  She did have evaluation for  intraabdominal enlarged lymph nodes, this workup was negative  she has pectus deformity that does not require intervention  In 06/2024 she developed URI symptoms with fever.  She was evaluate in urgent care center viral studies negative. She has not been on an antibiotics .  She was subsequently given prednisone 50 mg for 5 days which she just completed. She had yellow/green mucous that was copious  She is having some sinus symptoms.  CXR at  was  unremarkable by report  Taking Mucinex D and Flonase.   She had  wheeze and rhonchi bilaterally, despite oral prednisone  I reviewed  CXR images that show increased markings on PA images, no clear infiltrate.  Pneumonia not ruled out to my review  Was treated for sinusitis and possible pulmonary infection Last visit she was treated as asthma exacerbation with possible sinus infection  rule out pneumonia Took Oral antibiotics doxycycline  100 mg bid for 7 days  and a steroid taper   prednisone 30 mg for 2 days, 20 mg for 2 days 10 mg for 2 days  extra strength saline nasal at night, Flonase at night  started on ICS LAMA LABA  with  albuterol MDI as needed   She was  instructed to  postpone gyn surgery  She returns for follow up  She feels much improved though has some residual symptoms  PLAN Will continue ICS LABA for a few more weeks then taper off  Will use Flonase and saline  may use albuterol MDI as needed    No need for further antibiotics   There is no pulmonary contraindication to the planned surgery. She may proceed with gynecologic surgery.  She should continue ICS LABA and albuterol MDI as needed Continue nasal sprays  Usual DVT prophylaxis  is necessary.     Total time spent : 30 minutes Including: Preparation prior to visit - Reviewing prior record, results of tests and Consultation Reports as applicable Conducting an appropriate H & P during today's encounter  reviewing all available CT chest exams.  demonstrating images to pt as appropriate Counseling patient  Note completion  med renewal discussing differential diagnosis   Neo Mayers MD

## 2024-09-18 ENCOUNTER — APPOINTMENT (OUTPATIENT)
Dept: NEUROLOGY | Facility: CLINIC | Age: 54
End: 2024-09-18
Payer: COMMERCIAL

## 2024-09-18 VITALS
WEIGHT: 160 LBS | SYSTOLIC BLOOD PRESSURE: 126 MMHG | HEART RATE: 86 BPM | HEIGHT: 66 IN | BODY MASS INDEX: 25.71 KG/M2 | OXYGEN SATURATION: 97 % | DIASTOLIC BLOOD PRESSURE: 70 MMHG

## 2024-09-18 DIAGNOSIS — F07.81 POSTCONCUSSIONAL SYNDROME: ICD-10-CM

## 2024-09-18 DIAGNOSIS — R41.89 OTHER SYMPTOMS AND SIGNS INVOLVING COGNITIVE FUNCTIONS AND AWARENESS: ICD-10-CM

## 2024-09-18 DIAGNOSIS — R06.83 SNORING: ICD-10-CM

## 2024-09-18 DIAGNOSIS — R51.9 HEADACHE, UNSPECIFIED: ICD-10-CM

## 2024-09-18 PROCEDURE — G2211 COMPLEX E/M VISIT ADD ON: CPT

## 2024-09-18 PROCEDURE — 99214 OFFICE O/P EST MOD 30 MIN: CPT

## 2024-09-18 NOTE — ASSESSMENT
[FreeTextEntry1] : 1. Occipital headaches (chronic), post concussive- Headaches have improved. - Continue gabapentin 300 mg BID.  - Magnesium 400 mg qhs - OTC pain medications for pain relief as needed Headache education: [] Stay well hydrated [] Limit excessive caffeine and alcohol intake [] Maintain good sleep hygiene. Follow a consistent sleep and wake schedule. [] Practice good eating habits. Avoid skipping meals. [] Try to avoid any known triggers [] Avoid excessive use of over the counter pain medications, as they can cause medication overuse headaches [] Keep a headache diary [] Relaxation techniques, biofeedback, massage therapy, acupunctures, and heating pads may be effective   2. Snoring- Recommend HST to rule out DOUGLAS. Pt wishes to address to this with PCP.  3. Cognitive concern- likely multifactorial (mood related, psychosocial stressors, chronic headaches, post concussion), low suspicion for neurodegenerative disorder. Rec psychotherapy.    RTC 6 months
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
## 2024-09-18 NOTE — DATA REVIEWED
[de-identified] : MRI brain 9/23/2021 (Dr Marsh office)- report reviewed- no intraparenchymal abnormalities. Incidental intraventricular subarachnoid cyst.

## 2024-10-07 ENCOUNTER — LABORATORY RESULT (OUTPATIENT)
Age: 54
End: 2024-10-07

## 2024-10-07 ENCOUNTER — APPOINTMENT (OUTPATIENT)
Dept: INTERNAL MEDICINE | Facility: CLINIC | Age: 54
End: 2024-10-07

## 2024-10-07 VITALS
DIASTOLIC BLOOD PRESSURE: 75 MMHG | WEIGHT: 160 LBS | BODY MASS INDEX: 25.71 KG/M2 | HEART RATE: 76 BPM | SYSTOLIC BLOOD PRESSURE: 119 MMHG | OXYGEN SATURATION: 100 % | RESPIRATION RATE: 17 BRPM | TEMPERATURE: 97.9 F | HEIGHT: 66 IN

## 2024-10-07 DIAGNOSIS — E04.1 NONTOXIC SINGLE THYROID NODULE: ICD-10-CM

## 2024-10-07 DIAGNOSIS — Z23 ENCOUNTER FOR IMMUNIZATION: ICD-10-CM

## 2024-10-07 DIAGNOSIS — Z76.89 PERSONS ENCOUNTERING HEALTH SERVICES IN OTHER SPECIFIED CIRCUMSTANCES: ICD-10-CM

## 2024-10-07 DIAGNOSIS — Z87.891 PERSONAL HISTORY OF NICOTINE DEPENDENCE: ICD-10-CM

## 2024-10-07 DIAGNOSIS — R92.30 DENSE BREASTS, UNSPECIFIED: ICD-10-CM

## 2024-10-07 DIAGNOSIS — E78.5 HYPERLIPIDEMIA, UNSPECIFIED: ICD-10-CM

## 2024-10-07 DIAGNOSIS — Z00.00 ENCOUNTER FOR GENERAL ADULT MEDICAL EXAMINATION W/OUT ABNORMAL FINDINGS: ICD-10-CM

## 2024-10-07 PROCEDURE — 90656 IIV3 VACC NO PRSV 0.5 ML IM: CPT

## 2024-10-07 PROCEDURE — G0008: CPT

## 2024-10-07 PROCEDURE — 99396 PREV VISIT EST AGE 40-64: CPT | Mod: 25

## 2024-10-08 ENCOUNTER — APPOINTMENT (OUTPATIENT)
Dept: CARDIOLOGY | Facility: CLINIC | Age: 54
End: 2024-10-08
Payer: COMMERCIAL

## 2024-10-08 ENCOUNTER — NON-APPOINTMENT (OUTPATIENT)
Age: 54
End: 2024-10-08

## 2024-10-08 VITALS
BODY MASS INDEX: 25.55 KG/M2 | SYSTOLIC BLOOD PRESSURE: 110 MMHG | OXYGEN SATURATION: 99 % | HEART RATE: 85 BPM | DIASTOLIC BLOOD PRESSURE: 72 MMHG | HEIGHT: 66 IN | WEIGHT: 159 LBS

## 2024-10-08 DIAGNOSIS — R94.31 ABNORMAL ELECTROCARDIOGRAM [ECG] [EKG]: ICD-10-CM

## 2024-10-08 DIAGNOSIS — Z01.810 ENCOUNTER FOR PREPROCEDURAL CARDIOVASCULAR EXAMINATION: ICD-10-CM

## 2024-10-08 DIAGNOSIS — N81.10 CYSTOCELE, UNSPECIFIED: ICD-10-CM

## 2024-10-08 PROCEDURE — 99214 OFFICE O/P EST MOD 30 MIN: CPT

## 2024-10-08 PROCEDURE — 93000 ELECTROCARDIOGRAM COMPLETE: CPT | Mod: NC

## 2024-10-10 ENCOUNTER — OUTPATIENT (OUTPATIENT)
Dept: OUTPATIENT SERVICES | Facility: HOSPITAL | Age: 54
LOS: 1 days | End: 2024-10-10
Payer: COMMERCIAL

## 2024-10-10 VITALS
SYSTOLIC BLOOD PRESSURE: 90 MMHG | HEART RATE: 89 BPM | OXYGEN SATURATION: 97 % | WEIGHT: 158.95 LBS | TEMPERATURE: 98 F | DIASTOLIC BLOOD PRESSURE: 61 MMHG | HEIGHT: 66 IN | RESPIRATION RATE: 18 BRPM

## 2024-10-10 DIAGNOSIS — Z98.890 OTHER SPECIFIED POSTPROCEDURAL STATES: Chronic | ICD-10-CM

## 2024-10-10 DIAGNOSIS — R79.9 ABNORMAL FINDING OF BLOOD CHEMISTRY, UNSPECIFIED: ICD-10-CM

## 2024-10-10 DIAGNOSIS — R73.03 PREDIABETES.: ICD-10-CM

## 2024-10-10 DIAGNOSIS — N81.10 CYSTOCELE, UNSPECIFIED: ICD-10-CM

## 2024-10-10 LAB
25(OH)D3 SERPL-MCNC: 30.6 NG/ML
ABO + RH PNL BLD: NORMAL
ALBUMIN SERPL ELPH-MCNC: 4.5 G/DL
ALP BLD-CCNC: 75 U/L
ALT SERPL-CCNC: 13 U/L
ANION GAP SERPL CALC-SCNC: 11 MMOL/L — SIGNIFICANT CHANGE UP (ref 5–17)
ANION GAP SERPL CALC-SCNC: 12 MMOL/L
APPEARANCE: CLEAR
AST SERPL-CCNC: 16 U/L
BASOPHILS # BLD AUTO: 0.02 K/UL
BASOPHILS NFR BLD AUTO: 0.3 %
BILIRUB SERPL-MCNC: 0.3 MG/DL
BILIRUBIN URINE: NEGATIVE
BLOOD URINE: ABNORMAL
BUN SERPL-MCNC: 10 MG/DL
BUN SERPL-MCNC: 16 MG/DL — SIGNIFICANT CHANGE UP (ref 7–23)
CALCIUM SERPL-MCNC: 9.8 MG/DL — SIGNIFICANT CHANGE UP (ref 8.4–10.5)
CALCIUM SERPL-MCNC: 9.9 MG/DL
CHLORIDE SERPL-SCNC: 102 MMOL/L — SIGNIFICANT CHANGE UP (ref 96–108)
CHLORIDE SERPL-SCNC: 96 MMOL/L
CHOLEST SERPL-MCNC: 218 MG/DL
CO2 SERPL-SCNC: 24 MMOL/L — SIGNIFICANT CHANGE UP (ref 22–31)
CO2 SERPL-SCNC: 26 MMOL/L
COLOR: YELLOW
CREAT SERPL-MCNC: 0.48 MG/DL — LOW (ref 0.5–1.3)
CREAT SERPL-MCNC: 0.55 MG/DL
EGFR: 111 ML/MIN/1.73M2
EGFR: 115 ML/MIN/1.73M2 — SIGNIFICANT CHANGE UP
EOSINOPHIL # BLD AUTO: 0.14 K/UL
EOSINOPHIL NFR BLD AUTO: 2 %
ESTIMATED AVERAGE GLUCOSE: 117 MG/DL
GLUCOSE QUALITATIVE U: NEGATIVE MG/DL
GLUCOSE SERPL-MCNC: 112 MG/DL — HIGH (ref 70–99)
GLUCOSE SERPL-MCNC: 89 MG/DL
HBA1C MFR BLD HPLC: 5.7 %
HCT VFR BLD CALC: 37 % — SIGNIFICANT CHANGE UP (ref 34.5–45)
HCT VFR BLD CALC: 37.9 %
HDLC SERPL-MCNC: 46 MG/DL
HGB BLD-MCNC: 12.3 G/DL — SIGNIFICANT CHANGE UP (ref 11.5–15.5)
HGB BLD-MCNC: 12.4 G/DL
IMM GRANULOCYTES NFR BLD AUTO: 0.1 %
KETONES URINE: NEGATIVE MG/DL
LDLC SERPL CALC-MCNC: 145 MG/DL
LEUKOCYTE ESTERASE URINE: NEGATIVE
LYMPHOCYTES # BLD AUTO: 2.18 K/UL
LYMPHOCYTES NFR BLD AUTO: 31.9 %
MAN DIFF?: NORMAL
MCHC RBC-ENTMCNC: 30.2 PG — SIGNIFICANT CHANGE UP (ref 27–34)
MCHC RBC-ENTMCNC: 30.8 PG
MCHC RBC-ENTMCNC: 32.7 GM/DL
MCHC RBC-ENTMCNC: 33.2 GM/DL — SIGNIFICANT CHANGE UP (ref 32–36)
MCV RBC AUTO: 90.9 FL — SIGNIFICANT CHANGE UP (ref 80–100)
MCV RBC AUTO: 94.3 FL
MONOCYTES # BLD AUTO: 0.5 K/UL
MONOCYTES NFR BLD AUTO: 7.3 %
NEUTROPHILS # BLD AUTO: 3.98 K/UL
NEUTROPHILS NFR BLD AUTO: 58.4 %
NITRITE URINE: NEGATIVE
NONHDLC SERPL-MCNC: 171 MG/DL
NRBC # BLD: 0 /100 WBCS — SIGNIFICANT CHANGE UP (ref 0–0)
PH URINE: 7
PLATELET # BLD AUTO: 206 K/UL
PLATELET # BLD AUTO: 246 K/UL — SIGNIFICANT CHANGE UP (ref 150–400)
POTASSIUM SERPL-MCNC: 4.1 MMOL/L — SIGNIFICANT CHANGE UP (ref 3.5–5.3)
POTASSIUM SERPL-SCNC: 4.1 MMOL/L — SIGNIFICANT CHANGE UP (ref 3.5–5.3)
POTASSIUM SERPL-SCNC: 4.2 MMOL/L
PROT SERPL-MCNC: 7.2 G/DL
PROTEIN URINE: NEGATIVE MG/DL
RBC # BLD: 4.02 M/UL
RBC # BLD: 4.07 M/UL — SIGNIFICANT CHANGE UP (ref 3.8–5.2)
RBC # FLD: 13.6 % — SIGNIFICANT CHANGE UP (ref 10.3–14.5)
RBC # FLD: 14.3 %
SODIUM SERPL-SCNC: 134 MMOL/L
SODIUM SERPL-SCNC: 137 MMOL/L — SIGNIFICANT CHANGE UP (ref 135–145)
SPECIFIC GRAVITY URINE: 1.01
TRIGL SERPL-MCNC: 149 MG/DL
TSH SERPL-ACNC: 2.23 UIU/ML
UROBILINOGEN URINE: 0.2 MG/DL
WBC # BLD: 4.91 K/UL — SIGNIFICANT CHANGE UP (ref 3.8–10.5)
WBC # FLD AUTO: 4.91 K/UL — SIGNIFICANT CHANGE UP (ref 3.8–10.5)
WBC # FLD AUTO: 6.83 K/UL

## 2024-10-10 PROCEDURE — 85027 COMPLETE CBC AUTOMATED: CPT

## 2024-10-10 PROCEDURE — 80048 BASIC METABOLIC PNL TOTAL CA: CPT

## 2024-10-10 PROCEDURE — 87086 URINE CULTURE/COLONY COUNT: CPT

## 2024-10-10 PROCEDURE — G0463: CPT
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
## 2024-10-10 NOTE — H&P PST ADULT - ASSESSMENT
DASI score: 8.97 mets   DASI activity: climb 2 flights, 20 mins treadmill 2x/wk, run a short distance, carry groceries   Loose teeth or denture: no     MP  DASI score: 8.97 mets   DASI activity: climb 2 flights, 20 mins treadmill 2x/wk, run a short distance, carry groceries   Loose teeth or denture: no     MP 2

## 2024-10-10 NOTE — H&P PST ADULT - ATTENDING COMMENTS
pt with persistent prolapse, plan for anterior colporrhaphy, possible sacrospinous ligament fixation, cystoscopy. counseled about risks and benefits, pt to proceed

## 2024-10-10 NOTE — H&P PST ADULT - OTHER CARE PROVIDERS
Dr. NAIMA Shirley [phone]  (neurologist) last seen  Dr. BARRY Estevez [phone]  (cardiologist) last eval 10/8/24; pulm Cards-  Dr. BARRY Estevez [phone] last eval 10/8/24; Pulm- Dr. Mayers [phone] last eval 8/23/24; Neuro - Dr. NAIMA Shirley [phone]

## 2024-10-10 NOTE — H&P PST ADULT - NSICDXPASTSURGICALHX_GEN_ALL_CORE_FT
PAST SURGICAL HISTORY:  H/O colonoscopy     History of radial keratotomy     History of tonsillectomy

## 2024-10-10 NOTE — H&P PST ADULT - GENITOURINARY
Bed/Stretcher in lowest position, wheels locked, appropriate side rails in place/Call bell, personal items and telephone in reach/Instruct patient to call for assistance before getting out of bed/chair/stretcher/Non-slip footwear applied when patient is off stretcher/[place] to call system/Physically safe environment - no spills, clutter or unnecessary equipment/Purposeful proactive rounding/Room/bathroom lighting operational, light cord in reach details…

## 2024-10-10 NOTE — H&P PST ADULT - PROBLEM SELECTOR PLAN 1
Anterior Colporrhaphy, Possible Perineal Body Repair with Dr. Oshea on 10/31/24.   Pre-op instructions provided and questions answered. Pt verbalized understanding. Anterior Colporrhaphy, Possible Perineal Body Repair with Dr. Oshea on 10/31/24.   Pre-op instructions provided and all questions answered. Pt verbalized understanding.

## 2024-10-11 LAB
CULTURE RESULTS: SIGNIFICANT CHANGE UP
SPECIMEN SOURCE: SIGNIFICANT CHANGE UP

## 2024-10-14 ENCOUNTER — APPOINTMENT (OUTPATIENT)
Dept: INTERNAL MEDICINE | Facility: CLINIC | Age: 54
End: 2024-10-14
Payer: COMMERCIAL

## 2024-10-14 VITALS
RESPIRATION RATE: 17 BRPM | HEIGHT: 66 IN | SYSTOLIC BLOOD PRESSURE: 114 MMHG | WEIGHT: 160 LBS | BODY MASS INDEX: 25.71 KG/M2 | TEMPERATURE: 98 F | OXYGEN SATURATION: 97 % | HEART RATE: 82 BPM | DIASTOLIC BLOOD PRESSURE: 73 MMHG

## 2024-10-14 DIAGNOSIS — Z01.818 ENCOUNTER FOR OTHER PREPROCEDURAL EXAMINATION: ICD-10-CM

## 2024-10-14 PROBLEM — J40 BRONCHITIS, NOT SPECIFIED AS ACUTE OR CHRONIC: Chronic | Status: ACTIVE | Noted: 2024-10-10

## 2024-10-14 PROCEDURE — G2211 COMPLEX E/M VISIT ADD ON: CPT

## 2024-10-14 PROCEDURE — 99214 OFFICE O/P EST MOD 30 MIN: CPT

## 2024-10-17 ENCOUNTER — RX RENEWAL (OUTPATIENT)
Age: 54
End: 2024-10-17

## 2024-10-17 RX ORDER — SIMVASTATIN 10 MG/1
10 TABLET, FILM COATED ORAL
Qty: 90 | Refills: 0 | Status: ACTIVE | COMMUNITY
Start: 2024-10-17 | End: 1900-01-01

## 2024-10-31 ENCOUNTER — TRANSCRIPTION ENCOUNTER (OUTPATIENT)
Age: 54
End: 2024-10-31

## 2024-10-31 ENCOUNTER — OUTPATIENT (OUTPATIENT)
Dept: OUTPATIENT SERVICES | Facility: HOSPITAL | Age: 54
LOS: 1 days | End: 2024-10-31
Payer: COMMERCIAL

## 2024-10-31 ENCOUNTER — APPOINTMENT (OUTPATIENT)
Dept: UROLOGY | Facility: HOSPITAL | Age: 54
End: 2024-10-31

## 2024-10-31 VITALS
TEMPERATURE: 98 F | HEART RATE: 74 BPM | WEIGHT: 158.95 LBS | SYSTOLIC BLOOD PRESSURE: 104 MMHG | OXYGEN SATURATION: 96 % | DIASTOLIC BLOOD PRESSURE: 69 MMHG | RESPIRATION RATE: 16 BRPM | HEIGHT: 66 IN

## 2024-10-31 VITALS
DIASTOLIC BLOOD PRESSURE: 56 MMHG | HEART RATE: 80 BPM | OXYGEN SATURATION: 99 % | SYSTOLIC BLOOD PRESSURE: 107 MMHG | RESPIRATION RATE: 16 BRPM | TEMPERATURE: 98 F

## 2024-10-31 DIAGNOSIS — N81.10 CYSTOCELE, UNSPECIFIED: ICD-10-CM

## 2024-10-31 DIAGNOSIS — Z98.890 OTHER SPECIFIED POSTPROCEDURAL STATES: Chronic | ICD-10-CM

## 2024-10-31 PROCEDURE — 57240 ANTERIOR COLPORRHAPHY: CPT

## 2024-10-31 PROCEDURE — C9399: CPT

## 2024-10-31 RX ORDER — ONDANSETRON HYDROCHLORIDE 2 MG/ML
4 INJECTION, SOLUTION INTRAMUSCULAR; INTRAVENOUS ONCE
Refills: 0 | Status: ACTIVE | OUTPATIENT
Start: 2024-10-31 | End: 2025-09-29

## 2024-10-31 RX ORDER — LIDOCAINE HCL 60 MG/3 ML
0.2 SYRINGE (ML) INJECTION ONCE
Refills: 0 | Status: ACTIVE | OUTPATIENT
Start: 2024-10-31

## 2024-10-31 RX ORDER — FENTANYL CITRAT/DEXTROSE 5%/PF 1250MCG/50
25 PATIENT CONTROLLED ANALGESIA SYRINGE INTRAVENOUS
Refills: 0 | Status: DISCONTINUED | OUTPATIENT
Start: 2024-10-31 | End: 2024-10-31

## 2024-10-31 RX ORDER — OXYCODONE HYDROCHLORIDE 30 MG/1
1 TABLET ORAL
Qty: 6 | Refills: 0
Start: 2024-10-31

## 2024-10-31 RX ORDER — SODIUM CHLORIDE 9 MG/ML
3 INJECTION, SOLUTION INTRAMUSCULAR; INTRAVENOUS; SUBCUTANEOUS EVERY 8 HOURS
Refills: 0 | Status: ACTIVE | OUTPATIENT
Start: 2024-10-31 | End: 2025-09-29

## 2024-10-31 RX ORDER — OXYCODONE HYDROCHLORIDE 30 MG/1
5 TABLET ORAL ONCE
Refills: 0 | Status: DISCONTINUED | OUTPATIENT
Start: 2024-10-31 | End: 2024-10-31

## 2024-10-31 RX ORDER — CEFAZOLIN SODIUM 1 G
2000 VIAL (EA) INJECTION ONCE
Refills: 0 | Status: COMPLETED | OUTPATIENT
Start: 2024-10-31 | End: 2024-10-31

## 2024-10-31 RX ORDER — FENTANYL CITRAT/DEXTROSE 5%/PF 1250MCG/50
50 PATIENT CONTROLLED ANALGESIA SYRINGE INTRAVENOUS
Refills: 0 | Status: DISCONTINUED | OUTPATIENT
Start: 2024-10-31 | End: 2024-10-31

## 2024-10-31 RX ORDER — METHENAMINE MANDELATE 500 MG/5ML
1 SUSPENSION, ORAL (FINAL DOSE FORM) ORAL
Refills: 0 | DISCHARGE

## 2024-10-31 RX ORDER — ESTRADIOL 0.05MG/24H
1 PATCH, TRANSDERMAL SEMIWEEKLY TRANSDERMAL
Refills: 0 | DISCHARGE

## 2024-10-31 RX ADMIN — Medication 100 MILLILITER(S): at 06:38

## 2024-10-31 NOTE — ASU PATIENT PROFILE, ADULT - AS SC BRADEN MOISTURE
IP Acute Physical Therapy Evaluation  Plan of Care Note    Assessment: Patient presents at baseline which was independent with mobility. Pt presents s/p syncope episode resulting in fall. Lives with son in single story house. Pt able to demonstrate all functional mobility at an independent level including a full flight of stairs. Pt denies pain, dizziness, nausea. No safety concerns for home d/c. D/c PT    Recommendations and Plan:  PT Identified Barriers to Discharge: none  Recommendation for Discharge: PT: Home (02/02/17 1201)    Treatment Plan for Next Session: d/c PT    Below is key objective and subjective information as of the date/time noted.  For further details and goals, please refer to the PT Assess/Treat/Goals flowsheet.    Diagnosis:  1. Syncope and collapse    2. Syncope, unspecified syncope type    3. Chronic atrial fibrillation        Precautions:  Precautions  Other Precautions: activity as cheryl (02/02/17 1201)    Prior Living Situation:  Type of Home: House (02/02/17 1100)  Lives With: Son (02/02/17 1100)    Bed Mobility:  Bed Mobility  Supine to Sit: Independent (02/02/17 1201)  Sit to Supine: Independent (02/02/17 1201)  Bed Mobility Comments: no safety concerns (02/02/17 1201)    Transfers:  Transfers  Sit to Stand: Independent (02/02/17 1201)  Stand to Sit: Independent (02/02/17 1201)  Stand Pivot Transfers: Independent (02/02/17 1201)  Assistive Device/: None (02/02/17 1201)  Transfer Comments 1: no safety concerns (02/02/17 1201)    Gait:  Gait  Gait Assistance: Independent (02/02/17 1201)  Assistive Device/: None (02/02/17 1201)  Ambulation Distance (Feet): 150 Feet (02/02/17 1201)  Pattern: Within functional limits (02/02/17 1201)  Ambulation Surface: Carpet;Tile (02/02/17 1201)  Gait Comments 1: no safety concerns (02/02/17 1201)       Stairs:  Stairs Mobility  Number of Stairs: 14 (02/02/17 1201)  Stair Management Assistance: Modified Independent (02/02/17  1201)  Stair Management Technique: One rail R;Alternating pattern;Step to pattern;Forwards (02/02/17 1201)  Stairs Mobility Comments: switches between step-to and alternating, no safety concerns (02/02/17 1201)    Education:   On this date, the patient was educated on PT role, safety, gait, trnasfers, stairs.    The response to education was: Verbalizes understanding and Demonstrates understanding.    Equipment:  PT/OT Mobility Equipment for Discharge: None (02/02/17 1201)       Co-morbidities:   Patient Active Problem List   Diagnosis   • CAD s/p PTCA 2002   • Essential hypertension, benign   • Other and unspecified hyperlipidemia   • Lens replaced by other means   • Insomnia, unspecified   • Syncope and collapse   • AF (atrial fibrillation)   • Prostate cancer   • Hematuria   • Metastatic cancer   • Redundant prepuce and phimosis   • Urinary retention       Clinical Presentation: stable and/or uncomplicated characteristics          Interventions and Treatment Time:  Treatment/Interventions: Functional transfer training;Endurance training;Equipment eval/education;Bed mobility;Gait training;Compensatory technique education;Stairs retraining;Safety Education (02/02/17 1201)  PT Time Spent: 15 minutes (02/02/17 1201)   (4) rarely moist

## 2024-10-31 NOTE — ASU DISCHARGE PLAN (ADULT/PEDIATRIC) - NURSING INSTRUCTIONS
Next dose of Tylenol will be on or after ____3:40 PM_______ ,today/tonight and every 6 hours afterwards as needed for pain management, do not take any Tylenol containing products until this time. Your first dose of Tylenol was given at _____9:40 AM______. Do not exceed more than 4000mg of Tylenol in one 24 hour setting. If no contraindications, you may alternate with Ibuprofen  3 hours after dose of Tylenol. Ibuprofen can be taken every 6 hours, received at 9:40 AM, next dose on or after 3:40 PM as needed.

## 2024-10-31 NOTE — ASU PATIENT PROFILE, ADULT - NSICDXPASTMEDICALHX_GEN_ALL_CORE_FT
PAST MEDICAL HISTORY:  Bronchitis     Female cystocele     History of concussion     HLD (hyperlipidemia)     Lung nodule

## 2024-10-31 NOTE — ASU DISCHARGE PLAN (ADULT/PEDIATRIC) - FINANCIAL ASSISTANCE
[facility] provides services at a reduced cost to those who are determined to be eligible through [facility]’s financial assistance program. Information regarding [facility]’s financial assistance program can be found by going to [email or web address] or by calling [phone].

## 2024-10-31 NOTE — ASU PREOP CHECKLIST - PATIENT SENT TO
operating room Dermal Autograft Text: The defect edges were debeveled with a #15 scalpel blade.  Given the location of the defect, shape of the defect and the proximity to free margins a dermal autograft was deemed most appropriate.  Using a sterile surgical marker, the primary defect shape was transferred to the donor site. The area thus outlined was incised deep to adipose tissue with a #15 scalpel blade.  The harvested graft was then trimmed of adipose and epidermal tissue until only dermis was left.  The skin graft was then placed in the primary defect and oriented appropriately.

## 2024-11-05 ENCOUNTER — OUTPATIENT (OUTPATIENT)
Dept: OUTPATIENT SERVICES | Facility: HOSPITAL | Age: 54
LOS: 1 days | End: 2024-11-05
Payer: COMMERCIAL

## 2024-11-05 ENCOUNTER — RESULT REVIEW (OUTPATIENT)
Age: 54
End: 2024-11-05

## 2024-11-05 ENCOUNTER — APPOINTMENT (OUTPATIENT)
Dept: ULTRASOUND IMAGING | Facility: CLINIC | Age: 54
End: 2024-11-05
Payer: COMMERCIAL

## 2024-11-05 DIAGNOSIS — E04.1 NONTOXIC SINGLE THYROID NODULE: ICD-10-CM

## 2024-11-05 DIAGNOSIS — R92.30 DENSE BREASTS, UNSPECIFIED: ICD-10-CM

## 2024-11-05 DIAGNOSIS — Z98.890 OTHER SPECIFIED POSTPROCEDURAL STATES: Chronic | ICD-10-CM

## 2024-11-05 PROCEDURE — 76641 ULTRASOUND BREAST COMPLETE: CPT | Mod: 26,50

## 2024-11-05 PROCEDURE — 76536 US EXAM OF HEAD AND NECK: CPT | Mod: 26

## 2024-11-05 PROCEDURE — 76536 US EXAM OF HEAD AND NECK: CPT

## 2024-11-05 PROCEDURE — 76641 ULTRASOUND BREAST COMPLETE: CPT

## 2024-11-07 ENCOUNTER — APPOINTMENT (OUTPATIENT)
Dept: UROLOGY | Facility: HOSPITAL | Age: 54
End: 2024-11-07

## 2024-11-08 ENCOUNTER — APPOINTMENT (OUTPATIENT)
Dept: UROLOGY | Facility: CLINIC | Age: 54
End: 2024-11-08
Payer: COMMERCIAL

## 2024-11-08 VITALS
RESPIRATION RATE: 17 BRPM | OXYGEN SATURATION: 98 % | SYSTOLIC BLOOD PRESSURE: 108 MMHG | HEART RATE: 67 BPM | DIASTOLIC BLOOD PRESSURE: 70 MMHG

## 2024-11-08 PROCEDURE — 99213 OFFICE O/P EST LOW 20 MIN: CPT | Mod: 24

## 2024-11-09 ENCOUNTER — NON-APPOINTMENT (OUTPATIENT)
Age: 54
End: 2024-11-09

## 2024-11-11 LAB
APPEARANCE: CLEAR
BACTERIA UR CULT: NORMAL
BACTERIA: NEGATIVE /HPF
BILIRUBIN URINE: NEGATIVE
BLOOD URINE: NEGATIVE
CAST: 0 /LPF
COLOR: YELLOW
EPITHELIAL CELLS: 0 /HPF
GLUCOSE QUALITATIVE U: NEGATIVE MG/DL
KETONES URINE: NEGATIVE MG/DL
LEUKOCYTE ESTERASE URINE: NEGATIVE
MICROSCOPIC-UA: NORMAL
NITRITE URINE: NEGATIVE
PH URINE: 7.5
PROTEIN URINE: NEGATIVE MG/DL
RED BLOOD CELLS URINE: 0 /HPF
SPECIFIC GRAVITY URINE: 1.01
UROBILINOGEN URINE: 0.2 MG/DL
WHITE BLOOD CELLS URINE: 0 /HPF

## 2024-11-12 ENCOUNTER — APPOINTMENT (OUTPATIENT)
Dept: DERMATOLOGY | Facility: CLINIC | Age: 54
End: 2024-11-12
Payer: COMMERCIAL

## 2024-11-12 DIAGNOSIS — Z12.83 ENCOUNTER FOR SCREENING FOR MALIGNANT NEOPLASM OF SKIN: ICD-10-CM

## 2024-11-12 DIAGNOSIS — L81.4 OTHER MELANIN HYPERPIGMENTATION: ICD-10-CM

## 2024-11-12 DIAGNOSIS — L57.0 ACTINIC KERATOSIS: ICD-10-CM

## 2024-11-12 DIAGNOSIS — D22.9 MELANOCYTIC NEVI, UNSPECIFIED: ICD-10-CM

## 2024-11-12 DIAGNOSIS — L82.0 INFLAMED SEBORRHEIC KERATOSIS: ICD-10-CM

## 2024-11-12 PROCEDURE — 17110 DESTRUCTION B9 LES UP TO 14: CPT

## 2024-11-12 PROCEDURE — 99203 OFFICE O/P NEW LOW 30 MIN: CPT | Mod: 25

## 2024-11-12 PROCEDURE — 17000 DESTRUCT PREMALG LESION: CPT | Mod: 59

## 2024-11-14 ENCOUNTER — APPOINTMENT (OUTPATIENT)
Dept: PULMONOLOGY | Facility: CLINIC | Age: 54
End: 2024-11-14
Payer: COMMERCIAL

## 2024-11-14 VITALS
DIASTOLIC BLOOD PRESSURE: 71 MMHG | HEIGHT: 66 IN | HEART RATE: 80 BPM | WEIGHT: 157 LBS | TEMPERATURE: 98 F | OXYGEN SATURATION: 97 % | BODY MASS INDEX: 25.23 KG/M2 | SYSTOLIC BLOOD PRESSURE: 110 MMHG

## 2024-11-14 DIAGNOSIS — R91.1 SOLITARY PULMONARY NODULE: ICD-10-CM

## 2024-11-14 DIAGNOSIS — R09.89 OTHER SPECIFIED SYMPTOMS AND SIGNS INVOLVING THE CIRCULATORY AND RESPIRATORY SYSTEMS: ICD-10-CM

## 2024-11-14 PROCEDURE — 99214 OFFICE O/P EST MOD 30 MIN: CPT

## 2024-11-15 ENCOUNTER — APPOINTMENT (OUTPATIENT)
Dept: UROLOGY | Facility: CLINIC | Age: 54
End: 2024-11-15

## 2024-11-15 ENCOUNTER — TRANSCRIPTION ENCOUNTER (OUTPATIENT)
Age: 54
End: 2024-11-15

## 2024-11-15 VITALS
HEART RATE: 89 BPM | SYSTOLIC BLOOD PRESSURE: 109 MMHG | DIASTOLIC BLOOD PRESSURE: 69 MMHG | RESPIRATION RATE: 17 BRPM | OXYGEN SATURATION: 96 %

## 2024-11-15 DIAGNOSIS — N81.10 CYSTOCELE, UNSPECIFIED: ICD-10-CM

## 2024-11-15 PROCEDURE — 99024 POSTOP FOLLOW-UP VISIT: CPT

## 2024-11-15 PROCEDURE — 51798 US URINE CAPACITY MEASURE: CPT

## 2024-12-02 ENCOUNTER — TRANSCRIPTION ENCOUNTER (OUTPATIENT)
Age: 54
End: 2024-12-02

## 2024-12-11 ENCOUNTER — APPOINTMENT (OUTPATIENT)
Dept: DERMATOLOGY | Facility: CLINIC | Age: 54
End: 2024-12-11
Payer: COMMERCIAL

## 2024-12-11 DIAGNOSIS — L57.0 ACTINIC KERATOSIS: ICD-10-CM

## 2024-12-11 DIAGNOSIS — L82.0 INFLAMED SEBORRHEIC KERATOSIS: ICD-10-CM

## 2024-12-11 PROCEDURE — 99212 OFFICE O/P EST SF 10 MIN: CPT | Mod: 25

## 2024-12-11 PROCEDURE — 17000 DESTRUCT PREMALG LESION: CPT

## 2024-12-16 ENCOUNTER — APPOINTMENT (OUTPATIENT)
Dept: UROLOGY | Facility: CLINIC | Age: 54
End: 2024-12-16
Payer: COMMERCIAL

## 2024-12-16 VITALS
SYSTOLIC BLOOD PRESSURE: 118 MMHG | RESPIRATION RATE: 17 BRPM | OXYGEN SATURATION: 99 % | DIASTOLIC BLOOD PRESSURE: 76 MMHG | HEART RATE: 71 BPM

## 2024-12-16 DIAGNOSIS — N81.10 CYSTOCELE, UNSPECIFIED: ICD-10-CM

## 2024-12-16 DIAGNOSIS — R35.0 FREQUENCY OF MICTURITION: ICD-10-CM

## 2024-12-16 DIAGNOSIS — N39.0 URINARY TRACT INFECTION, SITE NOT SPECIFIED: ICD-10-CM

## 2024-12-16 PROCEDURE — 51798 US URINE CAPACITY MEASURE: CPT

## 2024-12-16 PROCEDURE — G2211 COMPLEX E/M VISIT ADD ON: CPT

## 2024-12-16 PROCEDURE — 99459 PELVIC EXAMINATION: CPT

## 2024-12-16 PROCEDURE — 99213 OFFICE O/P EST LOW 20 MIN: CPT | Mod: 24

## 2024-12-16 PROCEDURE — 99024 POSTOP FOLLOW-UP VISIT: CPT

## 2024-12-17 LAB
APPEARANCE: CLEAR
BACTERIA: NEGATIVE /HPF
BILIRUBIN URINE: NEGATIVE
BLOOD URINE: NEGATIVE
CAST: 0 /LPF
COLOR: YELLOW
EPITHELIAL CELLS: 0 /HPF
GLUCOSE QUALITATIVE U: NEGATIVE MG/DL
KETONES URINE: NEGATIVE MG/DL
LEUKOCYTE ESTERASE URINE: NEGATIVE
MICROSCOPIC-UA: NORMAL
NITRITE URINE: NEGATIVE
PH URINE: 7.5
PROTEIN URINE: NEGATIVE MG/DL
RED BLOOD CELLS URINE: 0 /HPF
SPECIFIC GRAVITY URINE: 1.01
UROBILINOGEN URINE: 0.2 MG/DL
WHITE BLOOD CELLS URINE: 0 /HPF

## 2024-12-18 LAB — BACTERIA UR CULT: NORMAL

## 2024-12-24 PROBLEM — F10.90 ALCOHOL USE: Status: ACTIVE | Noted: 2017-12-12

## 2024-12-31 ENCOUNTER — RX RENEWAL (OUTPATIENT)
Age: 54
End: 2024-12-31

## 2025-01-06 ENCOUNTER — TRANSCRIPTION ENCOUNTER (OUTPATIENT)
Age: 55
End: 2025-01-06

## 2025-01-08 ENCOUNTER — TRANSCRIPTION ENCOUNTER (OUTPATIENT)
Age: 55
End: 2025-01-08

## 2025-01-14 ENCOUNTER — NON-APPOINTMENT (OUTPATIENT)
Age: 55
End: 2025-01-14

## 2025-01-14 ENCOUNTER — TRANSCRIPTION ENCOUNTER (OUTPATIENT)
Age: 55
End: 2025-01-14

## 2025-01-15 ENCOUNTER — TRANSCRIPTION ENCOUNTER (OUTPATIENT)
Age: 55
End: 2025-01-15

## 2025-03-03 ENCOUNTER — APPOINTMENT (OUTPATIENT)
Dept: UROLOGY | Facility: CLINIC | Age: 55
End: 2025-03-03
Payer: COMMERCIAL

## 2025-03-03 VITALS
HEIGHT: 66 IN | TEMPERATURE: 98 F | HEART RATE: 85 BPM | SYSTOLIC BLOOD PRESSURE: 109 MMHG | WEIGHT: 157 LBS | DIASTOLIC BLOOD PRESSURE: 71 MMHG | RESPIRATION RATE: 17 BRPM | BODY MASS INDEX: 25.23 KG/M2 | OXYGEN SATURATION: 94 %

## 2025-03-03 DIAGNOSIS — N39.0 URINARY TRACT INFECTION, SITE NOT SPECIFIED: ICD-10-CM

## 2025-03-03 DIAGNOSIS — N32.81 OVERACTIVE BLADDER: ICD-10-CM

## 2025-03-03 DIAGNOSIS — N81.10 CYSTOCELE, UNSPECIFIED: ICD-10-CM

## 2025-03-03 PROCEDURE — G2211 COMPLEX E/M VISIT ADD ON: CPT | Mod: NC

## 2025-03-03 PROCEDURE — 99214 OFFICE O/P EST MOD 30 MIN: CPT

## 2025-03-03 PROCEDURE — 51798 US URINE CAPACITY MEASURE: CPT

## 2025-03-03 PROCEDURE — 99459 PELVIC EXAMINATION: CPT

## 2025-03-03 RX ORDER — MIRABEGRON 25 MG/1
25 TABLET, EXTENDED RELEASE ORAL
Qty: 30 | Refills: 6 | Status: ACTIVE | COMMUNITY
Start: 2025-03-03 | End: 1900-01-01

## 2025-03-04 LAB
APPEARANCE: CLEAR
BACTERIA: NEGATIVE /HPF
BILIRUBIN URINE: NEGATIVE
BLOOD URINE: NEGATIVE
CAST: 0 /LPF
COLOR: YELLOW
EPITHELIAL CELLS: 0 /HPF
GLUCOSE QUALITATIVE U: NEGATIVE MG/DL
KETONES URINE: NEGATIVE MG/DL
LEUKOCYTE ESTERASE URINE: NEGATIVE
MICROSCOPIC-UA: NORMAL
NITRITE URINE: NEGATIVE
PH URINE: 8
PROTEIN URINE: NEGATIVE MG/DL
RED BLOOD CELLS URINE: 0 /HPF
SPECIFIC GRAVITY URINE: 1.01
UROBILINOGEN URINE: 0.2 MG/DL
WHITE BLOOD CELLS URINE: 0 /HPF

## 2025-03-05 ENCOUNTER — TRANSCRIPTION ENCOUNTER (OUTPATIENT)
Age: 55
End: 2025-03-05

## 2025-03-05 LAB — BACTERIA UR CULT: NORMAL

## 2025-03-13 DIAGNOSIS — Z12.39 ENCOUNTER FOR OTHER SCREENING FOR MALIGNANT NEOPLASM OF BREAST: ICD-10-CM

## 2025-04-07 ENCOUNTER — RX RENEWAL (OUTPATIENT)
Age: 55
End: 2025-04-07

## 2025-04-10 ENCOUNTER — TRANSCRIPTION ENCOUNTER (OUTPATIENT)
Age: 55
End: 2025-04-10

## 2025-04-22 ENCOUNTER — APPOINTMENT (OUTPATIENT)
Dept: UROLOGY | Facility: CLINIC | Age: 55
End: 2025-04-22
Payer: COMMERCIAL

## 2025-04-22 DIAGNOSIS — R35.0 FREQUENCY OF MICTURITION: ICD-10-CM

## 2025-04-22 DIAGNOSIS — N32.81 OVERACTIVE BLADDER: ICD-10-CM

## 2025-04-22 DIAGNOSIS — N81.10 CYSTOCELE, UNSPECIFIED: ICD-10-CM

## 2025-04-22 DIAGNOSIS — N39.0 URINARY TRACT INFECTION, SITE NOT SPECIFIED: ICD-10-CM

## 2025-04-22 PROCEDURE — 99213 OFFICE O/P EST LOW 20 MIN: CPT | Mod: 93

## 2025-04-22 RX ORDER — SOLIFENACIN SUCCINATE 5 MG/1
5 TABLET ORAL DAILY
Qty: 30 | Refills: 3 | Status: ACTIVE | COMMUNITY
Start: 2025-04-22 | End: 1900-01-01

## 2025-05-02 ENCOUNTER — APPOINTMENT (OUTPATIENT)
Dept: NEUROSURGERY | Facility: CLINIC | Age: 55
End: 2025-05-02
Payer: COMMERCIAL

## 2025-05-02 ENCOUNTER — NON-APPOINTMENT (OUTPATIENT)
Age: 55
End: 2025-05-02

## 2025-05-02 VITALS
RESPIRATION RATE: 17 BRPM | WEIGHT: 157 LBS | HEART RATE: 70 BPM | HEIGHT: 66 IN | BODY MASS INDEX: 25.23 KG/M2 | DIASTOLIC BLOOD PRESSURE: 74 MMHG | SYSTOLIC BLOOD PRESSURE: 110 MMHG | OXYGEN SATURATION: 96 %

## 2025-05-02 DIAGNOSIS — G93.0 CEREBRAL CYSTS: ICD-10-CM

## 2025-05-02 PROCEDURE — 99204 OFFICE O/P NEW MOD 45 MIN: CPT

## 2025-05-02 RX ORDER — AMOXICILLIN 125 MG/1
125 TABLET, CHEWABLE ORAL
Refills: 0 | Status: ACTIVE | COMMUNITY

## 2025-05-02 RX ORDER — METHYLPREDNISOLONE 4 MG/1
4 TABLET ORAL
Refills: 0 | Status: ACTIVE | COMMUNITY

## 2025-05-02 RX ORDER — FLUTICASONE PROPIONATE 50 UG/1
50 POWDER, METERED RESPIRATORY (INHALATION)
Refills: 0 | Status: ACTIVE | COMMUNITY

## 2025-05-12 ENCOUNTER — NON-APPOINTMENT (OUTPATIENT)
Age: 55
End: 2025-05-12

## 2025-05-12 ENCOUNTER — OUTPATIENT (OUTPATIENT)
Dept: OUTPATIENT SERVICES | Facility: HOSPITAL | Age: 55
LOS: 1 days | End: 2025-05-12
Payer: COMMERCIAL

## 2025-05-12 ENCOUNTER — APPOINTMENT (OUTPATIENT)
Dept: MAMMOGRAPHY | Facility: CLINIC | Age: 55
End: 2025-05-12
Payer: COMMERCIAL

## 2025-05-12 DIAGNOSIS — Z12.31 ENCOUNTER FOR SCREENING MAMMOGRAM FOR MALIGNANT NEOPLASM OF BREAST: ICD-10-CM

## 2025-05-12 DIAGNOSIS — Z98.890 OTHER SPECIFIED POSTPROCEDURAL STATES: Chronic | ICD-10-CM

## 2025-05-12 DIAGNOSIS — Z00.8 ENCOUNTER FOR OTHER GENERAL EXAMINATION: ICD-10-CM

## 2025-05-12 PROCEDURE — 77063 BREAST TOMOSYNTHESIS BI: CPT

## 2025-05-12 PROCEDURE — 77067 SCR MAMMO BI INCL CAD: CPT

## 2025-05-12 PROCEDURE — 77067 SCR MAMMO BI INCL CAD: CPT | Mod: 26

## 2025-05-12 PROCEDURE — 77063 BREAST TOMOSYNTHESIS BI: CPT | Mod: 26

## 2025-05-13 ENCOUNTER — APPOINTMENT (OUTPATIENT)
Dept: INTERNAL MEDICINE | Facility: CLINIC | Age: 55
End: 2025-05-13
Payer: COMMERCIAL

## 2025-05-13 VITALS
RESPIRATION RATE: 17 BRPM | HEIGHT: 66 IN | SYSTOLIC BLOOD PRESSURE: 105 MMHG | OXYGEN SATURATION: 98 % | DIASTOLIC BLOOD PRESSURE: 71 MMHG | HEART RATE: 77 BPM | BODY MASS INDEX: 24.43 KG/M2 | WEIGHT: 152 LBS | TEMPERATURE: 97.8 F

## 2025-05-13 DIAGNOSIS — Z01.84 ENCOUNTER FOR ANTIBODY RESPONSE EXAMINATION: ICD-10-CM

## 2025-05-13 DIAGNOSIS — R59.0 LOCALIZED ENLARGED LYMPH NODES: ICD-10-CM

## 2025-05-13 DIAGNOSIS — Z00.00 ENCOUNTER FOR GENERAL ADULT MEDICAL EXAMINATION W/OUT ABNORMAL FINDINGS: ICD-10-CM

## 2025-05-13 DIAGNOSIS — Z12.39 ENCOUNTER FOR OTHER SCREENING FOR MALIGNANT NEOPLASM OF BREAST: ICD-10-CM

## 2025-05-13 DIAGNOSIS — K86.2 CYST OF PANCREAS: ICD-10-CM

## 2025-05-13 DIAGNOSIS — E04.2 NONTOXIC MULTINODULAR GOITER: ICD-10-CM

## 2025-05-13 DIAGNOSIS — R76.8 OTHER SPECIFIED ABNORMAL IMMUNOLOGICAL FINDINGS IN SERUM: ICD-10-CM

## 2025-05-13 DIAGNOSIS — E78.5 HYPERLIPIDEMIA, UNSPECIFIED: ICD-10-CM

## 2025-05-13 DIAGNOSIS — Z87.891 PERSONAL HISTORY OF NICOTINE DEPENDENCE: ICD-10-CM

## 2025-05-13 DIAGNOSIS — H61.23 IMPACTED CERUMEN, BILATERAL: ICD-10-CM

## 2025-05-13 DIAGNOSIS — R92.30 DENSE BREASTS, UNSPECIFIED: ICD-10-CM

## 2025-05-13 DIAGNOSIS — R21 RASH AND OTHER NONSPECIFIC SKIN ERUPTION: ICD-10-CM

## 2025-05-13 DIAGNOSIS — R73.03 PREDIABETES.: ICD-10-CM

## 2025-05-13 DIAGNOSIS — Z86.39 PERSONAL HISTORY OF OTHER ENDOCRINE, NUTRITIONAL AND METABOLIC DISEASE: ICD-10-CM

## 2025-05-13 PROCEDURE — 99396 PREV VISIT EST AGE 40-64: CPT

## 2025-05-14 ENCOUNTER — TRANSCRIPTION ENCOUNTER (OUTPATIENT)
Age: 55
End: 2025-05-14

## 2025-05-14 LAB
25(OH)D3 SERPL-MCNC: 41.3 NG/ML
ALBUMIN SERPL ELPH-MCNC: 4.5 G/DL
ALP BLD-CCNC: 80 U/L
ALT SERPL-CCNC: 22 U/L
ANION GAP SERPL CALC-SCNC: 13 MMOL/L
APPEARANCE: CLEAR
AST SERPL-CCNC: 23 U/L
BACTERIA: NEGATIVE /HPF
BASOPHILS # BLD AUTO: 0.03 K/UL
BASOPHILS NFR BLD AUTO: 0.5 %
BILIRUB SERPL-MCNC: 0.3 MG/DL
BILIRUBIN URINE: NEGATIVE
BLOOD URINE: NEGATIVE
BUN SERPL-MCNC: 14 MG/DL
CALCIUM SERPL-MCNC: 9.4 MG/DL
CAST: 0 /LPF
CHLORIDE SERPL-SCNC: 103 MMOL/L
CHOLEST SERPL-MCNC: 207 MG/DL
CO2 SERPL-SCNC: 24 MMOL/L
COLOR: YELLOW
CREAT SERPL-MCNC: 0.59 MG/DL
EGFRCR SERPLBLD CKD-EPI 2021: 108 ML/MIN/1.73M2
EOSINOPHIL # BLD AUTO: 0.12 K/UL
EOSINOPHIL NFR BLD AUTO: 2.2 %
EPITHELIAL CELLS: 1 /HPF
ESTIMATED AVERAGE GLUCOSE: 114 MG/DL
GLUCOSE QUALITATIVE U: NEGATIVE MG/DL
GLUCOSE SERPL-MCNC: 93 MG/DL
HBA1C MFR BLD HPLC: 5.6 %
HBV SURFACE AB SER QL: NONREACTIVE
HCT VFR BLD CALC: 41.8 %
HDLC SERPL-MCNC: 63 MG/DL
HGB BLD-MCNC: 13.3 G/DL
IMM GRANULOCYTES NFR BLD AUTO: 0.2 %
KETONES URINE: NEGATIVE MG/DL
LDLC SERPL-MCNC: 126 MG/DL
LEUKOCYTE ESTERASE URINE: NEGATIVE
LYMPHOCYTES # BLD AUTO: 2.12 K/UL
LYMPHOCYTES NFR BLD AUTO: 38.4 %
MAN DIFF?: NORMAL
MCHC RBC-ENTMCNC: 30.4 PG
MCHC RBC-ENTMCNC: 31.8 G/DL
MCV RBC AUTO: 95.7 FL
MEV IGG FLD QL IA: 57.6 AU/ML
MEV IGG+IGM SER-IMP: POSITIVE
MICROSCOPIC-UA: NORMAL
MONOCYTES # BLD AUTO: 0.43 K/UL
MONOCYTES NFR BLD AUTO: 7.8 %
MUV AB SER-ACNC: POSITIVE
MUV IGG SER QL IA: 26.5 AU/ML
NEUTROPHILS # BLD AUTO: 2.81 K/UL
NEUTROPHILS NFR BLD AUTO: 50.9 %
NITRITE URINE: NEGATIVE
NONHDLC SERPL-MCNC: 144 MG/DL
PH URINE: 7
PLATELET # BLD AUTO: 213 K/UL
POTASSIUM SERPL-SCNC: 4.3 MMOL/L
PROT SERPL-MCNC: 7 G/DL
PROTEIN URINE: NEGATIVE MG/DL
RBC # BLD: 4.37 M/UL
RBC # FLD: 14.2 %
RED BLOOD CELLS URINE: 2 /HPF
RUBV IGG FLD-ACNC: 3.79 INDEX
RUBV IGG SER-IMP: POSITIVE
SODIUM SERPL-SCNC: 140 MMOL/L
SPECIFIC GRAVITY URINE: 1.01
T4 FREE SERPL-MCNC: 1 NG/DL
THYROGLOB AB SERPL-ACNC: 15.3 IU/ML
THYROPEROXIDASE AB SERPL IA-ACNC: <9 IU/ML
TRIGL SERPL-MCNC: 99 MG/DL
TSH SERPL-ACNC: 1.57 UIU/ML
UROBILINOGEN URINE: 0.2 MG/DL
VZV AB TITR SER: POSITIVE
VZV IGG SER IF-ACNC: 14 S/CO
WBC # FLD AUTO: 5.52 K/UL
WHITE BLOOD CELLS URINE: 0 /HPF

## 2025-05-15 ENCOUNTER — APPOINTMENT (OUTPATIENT)
Dept: INTERNAL MEDICINE | Facility: CLINIC | Age: 55
End: 2025-05-15
Payer: COMMERCIAL

## 2025-05-15 DIAGNOSIS — Z23 ENCOUNTER FOR IMMUNIZATION: ICD-10-CM

## 2025-05-15 LAB — BACTERIA UR CULT: NORMAL

## 2025-05-15 PROCEDURE — 90746 HEPB VACCINE 3 DOSE ADULT IM: CPT

## 2025-05-15 PROCEDURE — G0010: CPT

## 2025-05-16 LAB
M TB IFN-G BLD-IMP: NEGATIVE
QUANTIFERON TB PLUS MITOGEN MINUS NIL: >10 IU/ML
QUANTIFERON TB PLUS NIL: 0.04 IU/ML
QUANTIFERON TB PLUS TB1 MINUS NIL: 0 IU/ML
QUANTIFERON TB PLUS TB2 MINUS NIL: 0 IU/ML

## 2025-05-21 ENCOUNTER — RESULT REVIEW (OUTPATIENT)
Age: 55
End: 2025-05-21

## 2025-05-21 ENCOUNTER — APPOINTMENT (OUTPATIENT)
Dept: ULTRASOUND IMAGING | Facility: CLINIC | Age: 55
End: 2025-05-21
Payer: COMMERCIAL

## 2025-05-21 ENCOUNTER — OUTPATIENT (OUTPATIENT)
Dept: OUTPATIENT SERVICES | Facility: HOSPITAL | Age: 55
LOS: 1 days | End: 2025-05-21
Payer: COMMERCIAL

## 2025-05-21 DIAGNOSIS — Z98.890 OTHER SPECIFIED POSTPROCEDURAL STATES: Chronic | ICD-10-CM

## 2025-05-21 DIAGNOSIS — Z00.8 ENCOUNTER FOR OTHER GENERAL EXAMINATION: ICD-10-CM

## 2025-05-21 PROCEDURE — 76536 US EXAM OF HEAD AND NECK: CPT

## 2025-05-21 PROCEDURE — 76641 ULTRASOUND BREAST COMPLETE: CPT | Mod: 26,50

## 2025-05-21 PROCEDURE — 76536 US EXAM OF HEAD AND NECK: CPT | Mod: 26

## 2025-05-21 PROCEDURE — 76641 ULTRASOUND BREAST COMPLETE: CPT

## 2025-05-22 ENCOUNTER — TRANSCRIPTION ENCOUNTER (OUTPATIENT)
Age: 55
End: 2025-05-22

## 2025-05-22 DIAGNOSIS — Z01.00 ENCOUNTER FOR EXAMINATION OF EYES AND VISION W/OUT ABNORMAL FINDINGS: ICD-10-CM

## 2025-07-08 ENCOUNTER — RX RENEWAL (OUTPATIENT)
Age: 55
End: 2025-07-08

## 2025-07-17 ENCOUNTER — NON-APPOINTMENT (OUTPATIENT)
Age: 55
End: 2025-07-17

## 2025-07-17 ENCOUNTER — APPOINTMENT (OUTPATIENT)
Dept: SURGICAL ONCOLOGY | Facility: CLINIC | Age: 55
End: 2025-07-17
Payer: COMMERCIAL

## 2025-07-17 VITALS
OXYGEN SATURATION: 97 % | RESPIRATION RATE: 17 BRPM | BODY MASS INDEX: 24.59 KG/M2 | HEART RATE: 83 BPM | HEIGHT: 66 IN | SYSTOLIC BLOOD PRESSURE: 109 MMHG | WEIGHT: 153 LBS | DIASTOLIC BLOOD PRESSURE: 70 MMHG

## 2025-07-17 PROCEDURE — 99214 OFFICE O/P EST MOD 30 MIN: CPT

## 2025-07-28 ENCOUNTER — OUTPATIENT (OUTPATIENT)
Dept: OUTPATIENT SERVICES | Facility: HOSPITAL | Age: 55
LOS: 1 days | End: 2025-07-28
Payer: COMMERCIAL

## 2025-07-28 ENCOUNTER — APPOINTMENT (OUTPATIENT)
Dept: CT IMAGING | Facility: CLINIC | Age: 55
End: 2025-07-28
Payer: COMMERCIAL

## 2025-07-28 DIAGNOSIS — Z98.890 OTHER SPECIFIED POSTPROCEDURAL STATES: Chronic | ICD-10-CM

## 2025-07-28 DIAGNOSIS — R59.9 ENLARGED LYMPH NODES, UNSPECIFIED: ICD-10-CM

## 2025-07-28 DIAGNOSIS — Z00.8 ENCOUNTER FOR OTHER GENERAL EXAMINATION: ICD-10-CM

## 2025-07-28 PROCEDURE — 74177 CT ABD & PELVIS W/CONTRAST: CPT | Mod: 26

## 2025-07-28 PROCEDURE — 71260 CT THORAX DX C+: CPT | Mod: 26

## 2025-07-28 PROCEDURE — 71260 CT THORAX DX C+: CPT

## 2025-07-28 PROCEDURE — 74177 CT ABD & PELVIS W/CONTRAST: CPT

## 2025-09-04 ENCOUNTER — APPOINTMENT (OUTPATIENT)
Dept: PSYCHIATRY | Facility: CLINIC | Age: 55
End: 2025-09-04

## 2025-09-05 DIAGNOSIS — Z00.00 ENCOUNTER FOR GENERAL ADULT MEDICAL EXAMINATION W/OUT ABNORMAL FINDINGS: ICD-10-CM

## 2025-09-05 DIAGNOSIS — Z12.83 ENCOUNTER FOR SCREENING FOR MALIGNANT NEOPLASM OF SKIN: ICD-10-CM

## (undated) DEVICE — PREP BETADINE KIT

## (undated) DEVICE — DRSG DERMABOND 0.7ML

## (undated) DEVICE — LONE STAR ELASTIC STAY HOOK 5MM SHARP

## (undated) DEVICE — SPECIMEN CONTAINER 100ML

## (undated) DEVICE — SUT POLYSORB 3-0 30" V-20 UNDYED

## (undated) DEVICE — SOL IRR BAG NS 0.9% 1000ML

## (undated) DEVICE — ELCTR BOVIE PENCIL SMOKE EVACUATION

## (undated) DEVICE — DRAPE TOWEL BLUE STICKY

## (undated) DEVICE — SYR LUER LOK 10CC

## (undated) DEVICE — LONE STAR RETRACTOR RING 32.5CM X 18.3CM DISP

## (undated) DEVICE — POSITIONER STRAP ARMBOARD VELCRO TS-30

## (undated) DEVICE — DRAPE UNDER BUTTOCKS W SCREEN

## (undated) DEVICE — DRSG CURITY GAUZE SPONGE 4 X 4" 12-PLY

## (undated) DEVICE — GLV 6.5 PROTEXIS (BLUE)

## (undated) DEVICE — GLV 6.5 PROTEXIS (WHITE)

## (undated) DEVICE — SUT POLYSORB 2-0 30" V-20 UNDYED

## (undated) DEVICE — SYR LUER LOK 20CC

## (undated) DEVICE — TUBING TUR 2 PRONG

## (undated) DEVICE — DRAPE LAVH 124" X 30" X125"

## (undated) DEVICE — FOLEY TRAY 16FR LF URINE METER SURESTEP

## (undated) DEVICE — PACK MINOR

## (undated) DEVICE — DRAPE INSTRUMENT POUCH 6.75" X 11"

## (undated) DEVICE — TUBING SUCTION 20FT

## (undated) DEVICE — WARMING BLANKET UPPER ADULT

## (undated) DEVICE — CANISTER DISPOSABLE THIN WALL 3000CC